# Patient Record
Sex: FEMALE | Race: WHITE | NOT HISPANIC OR LATINO | Employment: OTHER | URBAN - METROPOLITAN AREA
[De-identification: names, ages, dates, MRNs, and addresses within clinical notes are randomized per-mention and may not be internally consistent; named-entity substitution may affect disease eponyms.]

---

## 2017-01-06 ENCOUNTER — CLINICAL SUPPORT (OUTPATIENT)
Dept: ONCOLOGY | Facility: HOSPITAL | Age: 82
End: 2017-01-06

## 2017-01-06 ENCOUNTER — ANTICOAGULATION VISIT (OUTPATIENT)
Dept: LAB | Facility: HOSPITAL | Age: 82
End: 2017-01-06

## 2017-01-06 DIAGNOSIS — Z79.01 LONG TERM CURRENT USE OF ANTICOAGULANT: Primary | ICD-10-CM

## 2017-01-06 LAB
INR PPP: 2.1 (ref 0.9–1.1)
PROTHROMBIN TIME: 24.9 SECONDS (ref 11–13.5)

## 2017-01-06 PROCEDURE — 85610 PROTHROMBIN TIME: CPT | Performed by: INTERNAL MEDICINE

## 2017-01-27 ENCOUNTER — OFFICE VISIT (OUTPATIENT)
Dept: INTERNAL MEDICINE | Facility: CLINIC | Age: 82
End: 2017-01-27

## 2017-01-27 VITALS
BODY MASS INDEX: 33.66 KG/M2 | RESPIRATION RATE: 14 BRPM | HEART RATE: 53 BPM | SYSTOLIC BLOOD PRESSURE: 162 MMHG | WEIGHT: 190 LBS | DIASTOLIC BLOOD PRESSURE: 80 MMHG | HEIGHT: 63 IN | OXYGEN SATURATION: 98 %

## 2017-01-27 DIAGNOSIS — N18.30 CHRONIC KIDNEY DISEASE (CKD), STAGE III (MODERATE) (HCC): Primary | ICD-10-CM

## 2017-01-27 DIAGNOSIS — H69.82 EUSTACHIAN TUBE DYSFUNCTION, LEFT: Primary | ICD-10-CM

## 2017-01-27 DIAGNOSIS — R73.9 HYPERGLYCEMIA: ICD-10-CM

## 2017-01-27 DIAGNOSIS — R73.03 PREDIABETES: ICD-10-CM

## 2017-01-27 DIAGNOSIS — I10 BENIGN ESSENTIAL HTN: ICD-10-CM

## 2017-01-27 DIAGNOSIS — E78.5 HYPERLIPIDEMIA, UNSPECIFIED HYPERLIPIDEMIA TYPE: ICD-10-CM

## 2017-01-27 LAB
ALBUMIN SERPL-MCNC: 3.6 G/DL (ref 3.5–5.2)
ALBUMIN/GLOB SERPL: 1.5 G/DL
ALP SERPL-CCNC: 56 U/L (ref 39–117)
ALT SERPL-CCNC: 10 U/L (ref 1–33)
AST SERPL-CCNC: 14 U/L (ref 1–32)
BASOPHILS # BLD MANUAL: 0 10*3/MM3 (ref 0–0.2)
BASOPHILS NFR BLD MANUAL: 0 % (ref 0–1.5)
BILIRUB SERPL-MCNC: 0.6 MG/DL (ref 0.1–1.2)
BUN SERPL-MCNC: 20 MG/DL (ref 8–23)
BUN/CREAT SERPL: 23.3 (ref 7–25)
CALCIUM SERPL-MCNC: 9.3 MG/DL (ref 8.2–9.6)
CHLORIDE SERPL-SCNC: 103 MMOL/L (ref 98–107)
CHOLEST SERPL-MCNC: 179 MG/DL (ref 0–200)
CO2 SERPL-SCNC: 24.5 MMOL/L (ref 22–29)
CREAT SERPL-MCNC: 0.86 MG/DL (ref 0.57–1)
DIFFERENTIAL COMMENT: ABNORMAL
EOSINOPHIL # BLD MANUAL: 0 10*3/MM3 (ref 0–0.7)
EOSINOPHIL NFR BLD MANUAL: 0 % (ref 0.3–6.2)
ERYTHROCYTE [DISTWIDTH] IN BLOOD BY AUTOMATED COUNT: 14.6 % (ref 11.7–13)
GLOBULIN SER CALC-MCNC: 2.4 GM/DL
GLUCOSE SERPL-MCNC: 90 MG/DL (ref 65–99)
HBA1C MFR BLD: 5.59 % (ref 4.8–5.6)
HCT VFR BLD AUTO: 44.2 % (ref 35.6–45.5)
HDLC SERPL-MCNC: 48 MG/DL (ref 40–60)
HGB BLD-MCNC: 13.4 G/DL (ref 11.9–15.5)
LDLC SERPL CALC-MCNC: 115 MG/DL (ref 0–100)
LYMPHOCYTES # BLD MANUAL: 5.86 10*3/MM3 (ref 0.9–4.8)
LYMPHOCYTES NFR BLD MANUAL: 59 % (ref 19.6–45.3)
MCH RBC QN AUTO: 29.6 PG (ref 26.9–32)
MCHC RBC AUTO-ENTMCNC: 30.3 G/DL (ref 32.4–36.3)
MCV RBC AUTO: 97.8 FL (ref 80.5–98.2)
MONOCYTES # BLD MANUAL: 1.09 10*3/MM3 (ref 0.2–1.2)
MONOCYTES NFR BLD MANUAL: 11 % (ref 5–12)
NEUTROPHILS # BLD MANUAL: 2.98 10*3/MM3 (ref 1.9–8.1)
NEUTROPHILS NFR BLD MANUAL: 30 % (ref 42.7–76)
PLATELET # BLD AUTO: 93 10*3/MM3 (ref 140–500)
PLATELET BLD QL SMEAR: ABNORMAL
POTASSIUM SERPL-SCNC: 3.9 MMOL/L (ref 3.5–5.2)
PROT SERPL-MCNC: 6 G/DL (ref 6–8.5)
RBC # BLD AUTO: 4.52 10*6/MM3 (ref 3.9–5.2)
RBC MORPH BLD: ABNORMAL
SODIUM SERPL-SCNC: 141 MMOL/L (ref 136–145)
TRIGL SERPL-MCNC: 80 MG/DL (ref 0–150)
TSH SERPL DL<=0.005 MIU/L-ACNC: 2.46 MIU/ML (ref 0.27–4.2)
VLDLC SERPL CALC-MCNC: 16 MG/DL (ref 5–40)
WBC # BLD AUTO: 9.93 10*3/MM3 (ref 4.5–10.7)

## 2017-01-27 PROCEDURE — 99213 OFFICE O/P EST LOW 20 MIN: CPT | Performed by: INTERNAL MEDICINE

## 2017-01-27 RX ORDER — MELOXICAM 15 MG/1
TABLET ORAL
Qty: 30 TABLET | Refills: 3 | Status: SHIPPED | OUTPATIENT
Start: 2017-01-27 | End: 2017-10-05 | Stop reason: SDUPTHER

## 2017-01-27 NOTE — PROGRESS NOTES
Subjective     Virginia Benavidez is a 89 y.o. female who presents with   Chief Complaint   Patient presents with   • Earache     Left ear pain x6 days       History of Present Illness     Since Sunday.  Left ear pain.  No drainage.  No sore throat.  Runny nose.  No fever.  No palpable pain.  Eating and talking make worse.     Review of Systems   Respiratory: Negative.    Cardiovascular: Negative.        The following portions of the patient's history were reviewed and updated as appropriate: allergies, current medications and problem list.    Patient Active Problem List    Diagnosis Date Noted   • Gout of big toe 08/10/2016   • Long term current use of anticoagulant 05/25/2016   • Primary osteoarthritis involving multiple joints 02/04/2016   • Chronic kidney disease (CKD), stage III (moderate) 01/25/2016     Note Last Updated: 1/25/2016     Impression: 07/23/2015 - Her Creatinine is stable.  She is continuing to use diclofenac bid despite that in January we had stopped this and switched to Meloxicam.  Her daughter says they have the meloxicam at home.  It is really not ideal to be on any NSAIDS.  She really does not feel she can function without them.  For now we will closely monitor her creatinine.  I have very explicitly warned Virginia and her daughter that she is at risk of really hurting her kidneys with continued use of nsaids.;      • Benign essential HTN 01/25/2016     Note Last Updated: 1/25/2016     Impression: 07/23/2015 - Well controlled. Cont current meds.  Impression: 01/12/2015 - Well controlled.  Cont current meds.  Check CMP today  Impression: 07/10/2014 - Continue Current meds.  I have encouraged her to  drink more water to protect her kidneys since she is on Lisinopril/HCTZ;      • Gait instability 01/25/2016     Note Last Updated: 1/25/2016     Impression: 01/12/2015 - Balance issues.  She has done PT several times without improvement.  I have recommended that she go to [place] and look at the four  pronged cane and walkers available.;      • HLD (hyperlipidemia) 01/25/2016     Note Last Updated: 1/25/2016     Impression: 07/10/2014 - I am stopping her simvastatin to see if this helps with her aches and pains and instability.;      • Prediabetes 01/25/2016     Note Last Updated: 1/25/2016     Impression: 07/23/2015 - Her A1c has improved despite no changes in her diet.  Will continue to monitor q6 mo  Impression: 01/12/2015 - No polyuria or polydipsia.  Will repeat A1c in 6 months  Impression: 07/10/2014 - Repeat labs today;      • Degenerative joint disease involving multiple joints 01/25/2016     Note Last Updated: 1/25/2016     Impression: 07/23/2015 - She never switched to the meloxicam. She is still using the diclofenac.  her daughter is going to have her try the meloxicam instead.  We really have to keep a very close eye on her creatinine.  They understand the risk to her kidneys with continued use of nsaids.  Impression: 01/12/2015 - We are going to try once a day meloxicam instead of diclofenac.  I am checking her kidney and liver function today.  I am certainly worried about the continued exposure to NSAIDS at her age, but without it her arthritis is really limiting.  If this does not work, we will consider going back to 50 mg bid of diclofenac.  Impression: 07/10/2014 - Continue diclofenac  She has been on this for years.  I have discussed in detail with her and her daughter that this can effect her kidney function and if her creatinine starts to increase, we will need to look at other options for her pain.;      • Disorder of peripheral nervous system 01/25/2016     Note Last Updated: 1/25/2016     Impression: 01/12/2015 - She has been using voltaren gel and this seems to help her.  I have refilled this today.  Impression: 07/10/2014 - Check B12 and TSH today;          Current Outpatient Prescriptions on File Prior to Visit   Medication Sig Dispense Refill   • diclofenac (VOLTAREN) 1 % gel gel Place  "on the skin. Apply 4 grams to lower extremeties bid as needed     • dorzolamide (TRUSOPT) 2 % ophthalmic solution 1 drop 3 (three) times a day.     • lisinopril (PRINIVIL,ZESTRIL) 40 MG tablet TAKE ONE TABLET BY MOUTH DAILY 90 tablet 2   • meloxicam (MOBIC) 15 MG tablet TAKE ONE TABLET BY MOUTH DAILY WITH FOOD 30 tablet 3   • timolol (TIMOPTIC) 0.25 % ophthalmic solution 1 drop 2 (two) times a day.     • warfarin (COUMADIN) 2 MG tablet Take 4mg Saturday and Sunday and 3mg all other days or as directed by MD. 90 tablet 0     No current facility-administered medications on file prior to visit.        Objective     Visit Vitals   • /80 (BP Location: Right arm, Patient Position: Sitting, Cuff Size: Adult)   • Pulse 53   • Resp 14   • Ht 62.6\" (159 cm)   • Wt 190 lb (86.2 kg)   • SpO2 98%   • BMI 34.09 kg/m2       Physical Exam   Constitutional: She is oriented to person, place, and time. She appears well-developed and well-nourished.   HENT:   Head: Normocephalic and atraumatic.   Right Ear: Hearing and tympanic membrane normal.   Left Ear: Hearing and tympanic membrane normal.   Mouth/Throat: No oropharyngeal exudate or posterior oropharyngeal erythema.   Cardiovascular: Normal rate, regular rhythm and normal heart sounds.    Pulmonary/Chest: Effort normal and breath sounds normal.   Neurological: She is alert and oriented to person, place, and time.   Skin: Skin is warm and dry.   Psychiatric: She has a normal mood and affect. Her behavior is normal.       Assessment/Plan   Virginia was seen today for earache.    Diagnoses and all orders for this visit:    Eustachian tube dysfunction, left        Discussion    Patient presents with ear pain most c/w eustachian tube dysfunction.  TM is normal on exam.  Trial of Mucinex and Flonase OTC.  Let me know if not feeling better over the next 3 days or if there is any change in symptoms.  I would refer to ENT.      BP is increased today.  I did encourage monitoring at " home.  She requests her labs for Dr. Yip f/u that is coming up.           Future Appointments  Date Time Provider Department Center   2/7/2017 8:00 AM MD OSMANY [facility] P[facility] None   2/9/2017 8:00 AM LAB CHAIR 6 [facility]  LAB [facility]   2/9/2017 8:40 AM [facility]

## 2017-01-27 NOTE — MR AVS SNAPSHOT
Virginia PATINO Reema   1/27/2017 9:00 AM   Office Visit    Dept Phone:  [phone]   Encounter #:  [id number]    Provider:  Nelly Llanos MD   Department:  [facility] INTERNAL MEDICINE                Your Full Care Plan              Your Updated Medication List          This list is accurate as of: 1/27/17  9:16 AM.  Always use your most recent med list.                diclofenac 1 % gel gel   Commonly known as:  VOLTAREN       dorzolamide 2 % ophthalmic solution   Commonly known as:  TRUSOPT       lisinopril 40 MG tablet   Commonly known as:  PRINIVIL,ZESTRIL   TAKE ONE TABLET BY MOUTH DAILY       meloxicam 15 MG tablet   Commonly known as:  MOBIC   TAKE ONE TABLET BY MOUTH DAILY WITH FOOD       timolol 0.25 % ophthalmic solution   Commonly known as:  TIMOPTIC       warfarin 2 MG tablet   Commonly known as:  COUMADIN   Take 4mg Saturday and Sunday and 3mg all other days or as directed by MD.               Instructions     None    Patient Instructions History      Upcoming Appointments     Visit Type Date Time Department    OFFICE VISIT 1/27/2017  9:00 AM [facility]    OFFICE VISIT 2/7/2017  8:00 AM [facility]    FOLLOW UP 2 UNIT 2/9/2017  8:40 AM [facility] ONC CBC [facility]    LAB 2/9/2017  8:00 AM [facility] ONC CBC LAB K[facility]      MyChart Signup     Our records indicate that your [facility] account has been deactivated. If you would like to reactivate your account, please email [email or web address] or call [phone] to talk to our [facility] staff.             Other Info from Your Visit           Your Appointments     Feb 07, 2017  8:00 AM EST   Office Visit with Lena Yip MD   [facility] INTERNAL MEDICINE (--)    [address]  [facility] [address] [phone]           Arrive 15 minutes prior to appointment.            Feb 09, 2017  8:00 AM EST   Lab with LAB CHAIR 6 NOEMY SY   [facility] CBC  "LAB ([place])    [address] [address]   [phone]            Feb 09, 2017  8:40 AM EST   FOLLOW UP with J Luis Velez MD   [facility]: [facility]SULT[facility] IN BLOOD DISORDERS AND CANCER ([facility])    [address] [address]   [phone]              Allergies     Morphine And Related      Penicillins        Reason for Visit     Earache Left ear pain x6 days      Vital Signs     Blood Pressure Pulse Respirations Height Weight Oxygen Saturation    162/80 (BP Location: Right arm, Patient Position: Sitting, Cuff Size: Adult) 53 14 62.6\" (159 cm) 190 lb (86.2 kg) 98%    Body Mass Index Smoking Status                34.09 kg/m2 Never Smoker            "

## 2017-02-01 ENCOUNTER — APPOINTMENT (OUTPATIENT)
Dept: LAB | Facility: HOSPITAL | Age: 82
End: 2017-02-01

## 2017-02-01 ENCOUNTER — APPOINTMENT (OUTPATIENT)
Dept: ONCOLOGY | Facility: HOSPITAL | Age: 82
End: 2017-02-01

## 2017-02-03 RX ORDER — WARFARIN SODIUM 2 MG/1
TABLET ORAL
Qty: 90 TABLET | Refills: 0 | Status: SHIPPED | OUTPATIENT
Start: 2017-02-03 | End: 2017-03-31 | Stop reason: SDUPTHER

## 2017-02-06 ENCOUNTER — RESULTS ENCOUNTER (OUTPATIENT)
Dept: INTERNAL MEDICINE | Facility: CLINIC | Age: 82
End: 2017-02-06

## 2017-02-06 DIAGNOSIS — N18.30 CHRONIC KIDNEY DISEASE (CKD), STAGE III (MODERATE) (HCC): ICD-10-CM

## 2017-02-06 DIAGNOSIS — M10.9 GOUT OF BIG TOE: ICD-10-CM

## 2017-02-06 DIAGNOSIS — I10 BENIGN ESSENTIAL HTN: ICD-10-CM

## 2017-02-06 DIAGNOSIS — R73.03 PREDIABETES: ICD-10-CM

## 2017-02-07 ENCOUNTER — OFFICE VISIT (OUTPATIENT)
Dept: INTERNAL MEDICINE | Facility: CLINIC | Age: 82
End: 2017-02-07

## 2017-02-07 VITALS
OXYGEN SATURATION: 98 % | WEIGHT: 182 LBS | HEIGHT: 63 IN | BODY MASS INDEX: 32.25 KG/M2 | DIASTOLIC BLOOD PRESSURE: 60 MMHG | HEART RATE: 59 BPM | SYSTOLIC BLOOD PRESSURE: 144 MMHG | RESPIRATION RATE: 18 BRPM

## 2017-02-07 DIAGNOSIS — R73.03 PREDIABETES: ICD-10-CM

## 2017-02-07 DIAGNOSIS — N18.30 CHRONIC KIDNEY DISEASE (CKD), STAGE III (MODERATE) (HCC): ICD-10-CM

## 2017-02-07 DIAGNOSIS — I10 BENIGN ESSENTIAL HTN: Primary | ICD-10-CM

## 2017-02-07 DIAGNOSIS — R60.0 BILATERAL LOWER EXTREMITY EDEMA: ICD-10-CM

## 2017-02-07 PROCEDURE — 99214 OFFICE O/P EST MOD 30 MIN: CPT | Performed by: INTERNAL MEDICINE

## 2017-02-07 RX ORDER — FUROSEMIDE 20 MG/1
10 TABLET ORAL DAILY PRN
Qty: 15 TABLET | Refills: 3 | Status: SHIPPED | OUTPATIENT
Start: 2017-02-07 | End: 2017-10-11

## 2017-02-09 ENCOUNTER — LAB (OUTPATIENT)
Dept: LAB | Facility: HOSPITAL | Age: 82
End: 2017-02-09

## 2017-02-09 ENCOUNTER — OFFICE VISIT (OUTPATIENT)
Dept: ONCOLOGY | Facility: CLINIC | Age: 82
End: 2017-02-09

## 2017-02-09 VITALS
SYSTOLIC BLOOD PRESSURE: 122 MMHG | HEART RATE: 68 BPM | BODY MASS INDEX: 32.92 KG/M2 | RESPIRATION RATE: 16 BRPM | WEIGHT: 185.8 LBS | DIASTOLIC BLOOD PRESSURE: 72 MMHG | HEIGHT: 63 IN | TEMPERATURE: 97.6 F

## 2017-02-09 DIAGNOSIS — D68.59 THROMBOPHILIA (HCC): Primary | ICD-10-CM

## 2017-02-09 DIAGNOSIS — Z79.01 LONG TERM CURRENT USE OF ANTICOAGULANT: Primary | ICD-10-CM

## 2017-02-09 DIAGNOSIS — C91.10 CLL (CHRONIC LYMPHOCYTIC LEUKEMIA) (HCC): ICD-10-CM

## 2017-02-09 LAB
DEPRECATED RDW RBC AUTO: 47.6 FL (ref 37–49)
ERYTHROCYTE [DISTWIDTH] IN BLOOD BY AUTOMATED COUNT: 14.1 % (ref 11.7–14.5)
HCT VFR BLD AUTO: 39.2 % (ref 34–45)
HGB BLD-MCNC: 12.7 G/DL (ref 11.5–14.9)
INR PPP: 1.7 (ref 0.9–1.1)
MCH RBC QN AUTO: 30 PG (ref 27–33)
MCHC RBC AUTO-ENTMCNC: 32.4 G/DL (ref 32–35)
MCV RBC AUTO: 92.7 FL (ref 83–97)
PLATELET # BLD AUTO: 72 10*3/MM3 (ref 150–375)
PMV BLD AUTO: 12.6 FL (ref 8.9–12.1)
PROTHROMBIN TIME: 20.7 SECONDS (ref 11–13.5)
RBC # BLD AUTO: 4.23 10*6/MM3 (ref 3.9–5)
WBC NRBC COR # BLD: 9.42 10*3/MM3 (ref 4–10)

## 2017-02-09 PROCEDURE — 85610 PROTHROMBIN TIME: CPT | Performed by: INTERNAL MEDICINE

## 2017-02-09 PROCEDURE — 99214 OFFICE O/P EST MOD 30 MIN: CPT | Performed by: INTERNAL MEDICINE

## 2017-02-09 PROCEDURE — 36416 COLLJ CAPILLARY BLOOD SPEC: CPT | Performed by: INTERNAL MEDICINE

## 2017-02-09 PROCEDURE — 85027 COMPLETE CBC AUTOMATED: CPT | Performed by: INTERNAL MEDICINE

## 2017-02-09 NOTE — PROGRESS NOTES
Subjective .     REASONS FOR FOLLOWUP:    CLL  Thrombophilia  Thrombocytopenia    HISTORY OF PRESENT ILLNESS:  The patient is a 89 y.o. year old female  who is here for follow-up with the above-mentioned history.   She returns today for ongoing warfarin management. She also has exacerbation of thrombocytopenia.Despite these issues, she feels well.    Past Medical History   Diagnosis Date   • Antiphospholipid syndrome    • Atresia (acquired) of cervix      atresia   • Breast cancer      2005 with lumpectomy   • Chronic kidney disease      Stage III   • Chronic lymphocytic leukemia    • Cystocele    • Deep vein thrombosis of lower extremity    • Glaucoma    • History of radiation therapy    • Hyperlipidemia    • Hypertension    • Osteoarthritis    • Thrombocytopenia      Past Surgical History   Procedure Laterality Date   • Cholecystectomy     • Back surgery     • Breast biopsy  1992   • Breast lumpectomy         HEMATOLOGIC/ONCOLOGIC HISTORY:  (History from previous dates can be found in the separate document.)    MEDICATIONS    Current Outpatient Prescriptions:   •  diclofenac (VOLTAREN) 1 % gel gel, Place on the skin. Apply 4 grams to lower extremeties bid as needed, Disp: , Rfl:   •  dorzolamide (TRUSOPT) 2 % ophthalmic solution, 1 drop 3 (three) times a day., Disp: , Rfl:   •  furosemide (LASIX) 20 MG tablet, Take 0.5 tablets by mouth Daily As Needed (edema)., Disp: 15 tablet, Rfl: 3  •  lisinopril (PRINIVIL,ZESTRIL) 40 MG tablet, TAKE ONE TABLET BY MOUTH DAILY, Disp: 90 tablet, Rfl: 2  •  meloxicam (MOBIC) 15 MG tablet, TAKE ONE TABLET BY MOUTH DAILY WITH FOOD, Disp: 30 tablet, Rfl: 3  •  timolol (TIMOPTIC) 0.25 % ophthalmic solution, 1 drop 2 (two) times a day., Disp: , Rfl:   •  warfarin (COUMADIN) 2 MG tablet, TAKE ONE AND ONE-HALF (1  1/2) TABLET BY MOUTH DAILY FIVE DAYS A WEEK AND TAKE TWO TABLETS BY MOUTH DAILY SATURDAY AND SUNDAY OR AS DIRECTED, Disp: 90 tablet, Rfl: 0    ALLERGIES:     Allergies  "  Allergen Reactions   • Morphine And Related    • Penicillins        SOCIAL HISTORY:       Social History     Social History   • Marital status: Single     Spouse name: N/A   • Number of children: 3   • Years of education: N/A     Occupational History   •  Retired     Social History Main Topics   • Smoking status: Never Smoker   • Smokeless tobacco: Never Used   • Alcohol use Yes      Comment: Occasionally   • Drug use: Not on file   • Sexual activity: Not on file     Other Topics Concern   • Not on file     Social History Narrative         FAMILY HISTORY:  Family History   Problem Relation Age of Onset   • Alzheimer's disease Mother    • Emphysema Father    • Lung cancer Sister        REVIEW OF SYSTEMS:  GENERAL: No change in appetite or weight;   No fevers, chills, sweats.    SKIN: No nonhealing lesions.   No rashes.  HEME/LYMPH: No easy bruising, bleeding.   No swollen nodes.   EYES: No vision changes or diplopia.   ENT: No tinnitus, hearing loss, gum bleeding, epistaxis, hoarseness or dysphagia.   RESPIRATORY: No cough, shortness of breath, hemoptysis or wheezing.   CVS: No chest pain, palpitations, orthopnea, dyspnea on exertion or PND.   GI: No melena or hematochezia.   No abdominal pain.  No nausea, vomiting, constipation, diarrhea  : No lower tract obstructive symptoms, dysuria or hematuria.   MUSCULOSKELETAL: No bone pain.  No joint stiffness.   NEUROLOGICAL: No global weakness, loss of consciousness or seizures.   PSYCHIATRIC: No increased nervousness, mood changes or depression.     Objective    Vitals:    02/09/17 0807   BP: 122/72   Pulse: 68   Resp: 16   Temp: 97.6 °F (36.4 °C)   Weight: 185 lb 12.8 oz (84.3 kg)   Height: 62.6\" (159 cm)   PainSc: 0-No pain     Current Status 2/9/2017   ECOG score 0      PHYSICAL EXAM:    GENERAL:  Well-developed, well-nourished in no acute distress.   SKIN:  Warm, dry without rashes, purpura or petechiae.  EYES:  Pupils equal, round and reactive to light.  EOMs " intact.  Conjunctivae normal.  EARS:  Hearing intact.  NOSE:  Septum midline.  No excoriations or nasal discharge.  MOUTH:  Tongue is well-papillated; no stomatitis or ulcers.  Lips normal.  THROAT:  Oropharynx without lesions or exudates.  NECK:  Supple with good range of motion; no thyromegaly or masses, no JVD.  LYMPHATICS:  No cervical, supraclavicular, axillary or inguinal adenopathy.  CHEST:  Lungs clear to auscultation. Good airflow.  CARDIAC:  Regular rate and rhythm without murmurs, rubs or gallops. Normal S1,S2.  ABDOMEN:  Soft, nontender with no hepatosplenomegaly or masses.  EXTREMITIES:  No clubbing, cyanosis or edema.  NEUROLOGICAL:  Cranial Nerves II-XII grossly intact.  No focal neurological deficits.  PSYCHIATRIC:  Normal affect and mood.     RECENT LABS:        WBC   Date/Time Value Ref Range Status   02/09/2017 07:54 AM 9.42 4.00 - 10.00 10*3/mm3 Final   01/27/2017 09:11 AM 9.93 4.50 - 10.70 10*3/mm3 Final     HEMOGLOBIN   Date/Time Value Ref Range Status   02/09/2017 07:54 AM 12.7 11.5 - 14.9 g/dL Final   01/27/2017 09:11 AM 13.4 11.9 - 15.5 g/dL Final     PLATELETS   Date/Time Value Ref Range Status   02/09/2017 07:54 AM 72 (L) 150 - 375 10*3/mm3 Final   01/27/2017 09:11 AM 93 (L) 140 - 500 10*3/mm3 Final       Assessment/Plan   Thrombophilia: we will recheck her thrombophilia labs to assess ongoing need for warfarin as her platelets are borderline.   We will, in an effort to minimize risk of bleed,lower our INR range to 2-2.5.  CLL: no progression evident except perhaps the thrombocytopenia.  Thrombocytopenia:keep target INR 2-2.5 to minimize risk of bleed. If thrombophilia results are more favorable, then d/c warfarin?.

## 2017-03-07 ENCOUNTER — CLINICAL SUPPORT (OUTPATIENT)
Dept: ONCOLOGY | Facility: HOSPITAL | Age: 82
End: 2017-03-07

## 2017-03-07 ENCOUNTER — ANTICOAGULATION VISIT (OUTPATIENT)
Dept: LAB | Facility: HOSPITAL | Age: 82
End: 2017-03-07

## 2017-03-07 DIAGNOSIS — D68.59 THROMBOPHILIA (HCC): ICD-10-CM

## 2017-03-07 DIAGNOSIS — C91.10 CLL (CHRONIC LYMPHOCYTIC LEUKEMIA) (HCC): ICD-10-CM

## 2017-03-07 LAB
ALBUMIN SERPL-MCNC: 3.7 G/DL (ref 3.5–5.2)
ALBUMIN/GLOB SERPL: 1.5 G/DL (ref 1.1–2.4)
ALP SERPL-CCNC: 51 U/L (ref 38–116)
ALT SERPL W P-5'-P-CCNC: 11 U/L (ref 0–33)
ANION GAP SERPL CALCULATED.3IONS-SCNC: 14.1 MMOL/L
AST SERPL-CCNC: 17 U/L (ref 0–32)
BASOPHILS # BLD AUTO: 0.02 10*3/MM3 (ref 0–0.1)
BASOPHILS NFR BLD AUTO: 0.2 % (ref 0–1.1)
BILIRUB SERPL-MCNC: 0.6 MG/DL (ref 0.1–1.2)
BUN BLD-MCNC: 25 MG/DL (ref 6–20)
BUN/CREAT SERPL: 24.8 (ref 7.3–30)
CALCIUM SPEC-SCNC: 9.3 MG/DL (ref 8.5–10.2)
CHLORIDE SERPL-SCNC: 105 MMOL/L (ref 98–107)
CO2 SERPL-SCNC: 22.9 MMOL/L (ref 22–29)
CREAT BLD-MCNC: 1.01 MG/DL (ref 0.6–1.1)
DEPRECATED RDW RBC AUTO: 47.7 FL (ref 37–49)
EOSINOPHIL # BLD AUTO: 0 10*3/MM3 (ref 0–0.36)
EOSINOPHIL NFR BLD AUTO: 0 % (ref 1–5)
ERYTHROCYTE [DISTWIDTH] IN BLOOD BY AUTOMATED COUNT: 13.9 % (ref 11.7–14.5)
GFR SERPL CREATININE-BSD FRML MDRD: 51 ML/MIN/1.73
GLOBULIN UR ELPH-MCNC: 2.5 GM/DL (ref 1.8–3.5)
GLUCOSE BLD-MCNC: 154 MG/DL (ref 74–124)
HCT VFR BLD AUTO: 40.5 % (ref 34–45)
HGB BLD-MCNC: 12.8 G/DL (ref 11.5–14.9)
IMM GRANULOCYTES # BLD: 0.01 10*3/MM3 (ref 0–0.03)
IMM GRANULOCYTES NFR BLD: 0.1 % (ref 0–0.5)
INR PPP: 2.2 (ref 0.9–1.1)
LYMPHOCYTES # BLD AUTO: 7.35 10*3/MM3 (ref 1–3.5)
LYMPHOCYTES NFR BLD AUTO: 77.4 % (ref 20–49)
MCH RBC QN AUTO: 29.7 PG (ref 27–33)
MCHC RBC AUTO-ENTMCNC: 31.6 G/DL (ref 32–35)
MCV RBC AUTO: 94 FL (ref 83–97)
MONOCYTES # BLD AUTO: 0.38 10*3/MM3 (ref 0.25–0.8)
MONOCYTES NFR BLD AUTO: 4 % (ref 4–12)
NEUTROPHILS # BLD AUTO: 1.74 10*3/MM3 (ref 1.5–7)
NEUTROPHILS NFR BLD AUTO: 18.3 % (ref 39–75)
NRBC BLD MANUAL-RTO: 0 /100 WBC (ref 0–0)
PLATELET # BLD AUTO: 89 10*3/MM3 (ref 150–375)
PMV BLD AUTO: 11.6 FL (ref 8.9–12.1)
POTASSIUM BLD-SCNC: 4 MMOL/L (ref 3.5–4.7)
PROT SERPL-MCNC: 6.2 G/DL (ref 6.3–8)
PROTHROMBIN TIME: 26.9 SECONDS (ref 11–13.5)
RBC # BLD AUTO: 4.31 10*6/MM3 (ref 3.9–5)
SODIUM BLD-SCNC: 142 MMOL/L (ref 134–145)
WBC NRBC COR # BLD: 9.5 10*3/MM3 (ref 4–10)

## 2017-03-07 PROCEDURE — 85025 COMPLETE CBC W/AUTO DIFF WBC: CPT | Performed by: INTERNAL MEDICINE

## 2017-03-07 PROCEDURE — 36415 COLL VENOUS BLD VENIPUNCTURE: CPT | Performed by: INTERNAL MEDICINE

## 2017-03-07 PROCEDURE — 80053 COMPREHEN METABOLIC PANEL: CPT | Performed by: INTERNAL MEDICINE

## 2017-03-07 PROCEDURE — 85610 PROTHROMBIN TIME: CPT | Performed by: INTERNAL MEDICINE

## 2017-03-08 LAB
CARDIOLIPIN IGA SER IA-ACNC: <9 APL U/ML (ref 0–11)
CARDIOLIPIN IGA SER IA-ACNC: <9 APL U/ML (ref 0–11)
CARDIOLIPIN IGG SER IA-ACNC: <9 GPL U/ML (ref 0–14)
CARDIOLIPIN IGG SER IA-ACNC: <9 GPL U/ML (ref 0–14)
CARDIOLIPIN IGM SER IA-ACNC: 14 MPL U/ML (ref 0–12)
CARDIOLIPIN IGM SER IA-ACNC: 14 MPL U/ML (ref 0–12)

## 2017-03-09 LAB
B2 GLYCOPROT1 IGA SER-ACNC: <9 GPI IGA UNITS (ref 0–25)
B2 GLYCOPROT1 IGG SER-ACNC: <9 GPI IGG UNITS (ref 0–20)
B2 GLYCOPROT1 IGM SER-ACNC: 46 GPI IGM UNITS (ref 0–32)
PS IGA SER-ACNC: 1 APS IGA (ref 0–20)
PS IGG SER-ACNC: 1 GPS IGG (ref 0–11)
PS IGM SER-ACNC: 17 MPS IGM (ref 0–25)

## 2017-03-10 LAB
APTT HEX PL PPP: 11 SEC (ref 0–11)
INCUBATED PTT LA MIX: 42 SEC (ref 0–40.6)
LA NT DPL PPP: 86.7 SEC (ref 0–55)
LA NT DPL/LA NT HPL PPP-RTO: 0.99 RATIO (ref 0–1.4)
LA NT PLATELET PPP: 45 SEC (ref 0–43.6)
LUPUS ANTICOAGULANT REFLEX: ABNORMAL
PTT LA MIX: 38.1 SEC (ref 0–40.6)
SCREEN DRVVT: 40.7 SEC (ref 0–44)
THROMBIN TIME: 16.1 SEC (ref 0–20.9)

## 2017-03-15 ENCOUNTER — APPOINTMENT (OUTPATIENT)
Dept: LAB | Facility: HOSPITAL | Age: 82
End: 2017-03-15

## 2017-03-15 ENCOUNTER — APPOINTMENT (OUTPATIENT)
Dept: ONCOLOGY | Facility: HOSPITAL | Age: 82
End: 2017-03-15

## 2017-04-03 RX ORDER — WARFARIN SODIUM 2 MG/1
TABLET ORAL
Qty: 90 TABLET | Refills: 0 | Status: SHIPPED | OUTPATIENT
Start: 2017-04-03 | End: 2017-06-05 | Stop reason: SDUPTHER

## 2017-04-05 ENCOUNTER — CLINICAL SUPPORT (OUTPATIENT)
Dept: ONCOLOGY | Facility: HOSPITAL | Age: 82
End: 2017-04-05

## 2017-04-05 ENCOUNTER — ANTICOAGULATION VISIT (OUTPATIENT)
Dept: LAB | Facility: HOSPITAL | Age: 82
End: 2017-04-05

## 2017-04-05 DIAGNOSIS — Z79.01 LONG TERM CURRENT USE OF ANTICOAGULANT: ICD-10-CM

## 2017-04-05 DIAGNOSIS — D68.59 THROMBOPHILIA (HCC): Primary | ICD-10-CM

## 2017-04-05 LAB
BASOPHILS # BLD AUTO: 0.02 10*3/MM3 (ref 0–0.1)
BASOPHILS NFR BLD AUTO: 0.2 % (ref 0–1.1)
DEPRECATED RDW RBC AUTO: 46.6 FL (ref 37–49)
EOSINOPHIL # BLD AUTO: 0.06 10*3/MM3 (ref 0–0.36)
EOSINOPHIL NFR BLD AUTO: 0.6 % (ref 1–5)
ERYTHROCYTE [DISTWIDTH] IN BLOOD BY AUTOMATED COUNT: 13.8 % (ref 11.7–14.5)
HCT VFR BLD AUTO: 40.2 % (ref 34–45)
HGB BLD-MCNC: 13.1 G/DL (ref 11.5–14.9)
IMM GRANULOCYTES # BLD: 0.03 10*3/MM3 (ref 0–0.03)
IMM GRANULOCYTES NFR BLD: 0.3 % (ref 0–0.5)
INR PPP: 2.9 (ref 0.9–1.1)
LYMPHOCYTES # BLD AUTO: 6.6 10*3/MM3 (ref 1–3.5)
LYMPHOCYTES NFR BLD AUTO: 70.1 % (ref 20–49)
MCH RBC QN AUTO: 29.9 PG (ref 27–33)
MCHC RBC AUTO-ENTMCNC: 32.6 G/DL (ref 32–35)
MCV RBC AUTO: 91.8 FL (ref 83–97)
MONOCYTES # BLD AUTO: 0.73 10*3/MM3 (ref 0.25–0.8)
MONOCYTES NFR BLD AUTO: 7.7 % (ref 4–12)
NEUTROPHILS # BLD AUTO: 1.98 10*3/MM3 (ref 1.5–7)
NEUTROPHILS NFR BLD AUTO: 21.1 % (ref 39–75)
NRBC BLD MANUAL-RTO: 0 /100 WBC (ref 0–0)
PLATELET # BLD AUTO: 82 10*3/MM3 (ref 150–375)
PMV BLD AUTO: 11.5 FL (ref 8.9–12.1)
PROTHROMBIN TIME: 34.5 SECONDS (ref 11–13.5)
RBC # BLD AUTO: 4.38 10*6/MM3 (ref 3.9–5)
WBC NRBC COR # BLD: 9.42 10*3/MM3 (ref 4–10)

## 2017-04-05 PROCEDURE — 36415 COLL VENOUS BLD VENIPUNCTURE: CPT | Performed by: INTERNAL MEDICINE

## 2017-04-05 PROCEDURE — 85025 COMPLETE CBC W/AUTO DIFF WBC: CPT | Performed by: INTERNAL MEDICINE

## 2017-04-05 PROCEDURE — 85610 PROTHROMBIN TIME: CPT | Performed by: INTERNAL MEDICINE

## 2017-05-03 ENCOUNTER — CLINICAL SUPPORT (OUTPATIENT)
Dept: ONCOLOGY | Facility: HOSPITAL | Age: 82
End: 2017-05-03

## 2017-05-03 ENCOUNTER — ANTICOAGULATION VISIT (OUTPATIENT)
Dept: LAB | Facility: HOSPITAL | Age: 82
End: 2017-05-03

## 2017-05-03 DIAGNOSIS — Z79.01 LONG TERM CURRENT USE OF ANTICOAGULANT: Primary | ICD-10-CM

## 2017-05-03 LAB
DEPRECATED RDW RBC AUTO: 47.8 FL (ref 37–49)
ERYTHROCYTE [DISTWIDTH] IN BLOOD BY AUTOMATED COUNT: 14 % (ref 11.7–14.5)
HCT VFR BLD AUTO: 40.2 % (ref 34–45)
HGB BLD-MCNC: 13.1 G/DL (ref 11.5–14.9)
INR PPP: 2.6 (ref 0.9–1.1)
MCH RBC QN AUTO: 30.4 PG (ref 27–33)
MCHC RBC AUTO-ENTMCNC: 32.6 G/DL (ref 32–35)
MCV RBC AUTO: 93.3 FL (ref 83–97)
PLATELET # BLD AUTO: 73 10*3/MM3 (ref 150–375)
PMV BLD AUTO: 12.1 FL (ref 8.9–12.1)
PROTHROMBIN TIME: 31.7 SECONDS (ref 11–13.5)
RBC # BLD AUTO: 4.31 10*6/MM3 (ref 3.9–5)
WBC NRBC COR # BLD: 9.86 10*3/MM3 (ref 4–10)

## 2017-05-03 PROCEDURE — 85610 PROTHROMBIN TIME: CPT

## 2017-05-03 PROCEDURE — 36415 COLL VENOUS BLD VENIPUNCTURE: CPT

## 2017-05-03 PROCEDURE — 85027 COMPLETE CBC AUTOMATED: CPT

## 2017-05-24 ENCOUNTER — OFFICE VISIT (OUTPATIENT)
Dept: ONCOLOGY | Facility: CLINIC | Age: 82
End: 2017-05-24

## 2017-05-24 ENCOUNTER — LAB (OUTPATIENT)
Dept: LAB | Facility: HOSPITAL | Age: 82
End: 2017-05-24

## 2017-05-24 VITALS
WEIGHT: 182.6 LBS | OXYGEN SATURATION: 97 % | SYSTOLIC BLOOD PRESSURE: 154 MMHG | RESPIRATION RATE: 16 BRPM | DIASTOLIC BLOOD PRESSURE: 62 MMHG | TEMPERATURE: 97.8 F | BODY MASS INDEX: 32.36 KG/M2 | HEIGHT: 63 IN | HEART RATE: 54 BPM

## 2017-05-24 DIAGNOSIS — D68.59 THROMBOPHILIA (HCC): Primary | ICD-10-CM

## 2017-05-24 DIAGNOSIS — C91.10 CLL (CHRONIC LYMPHOCYTIC LEUKEMIA) (HCC): ICD-10-CM

## 2017-05-24 LAB
DEPRECATED RDW RBC AUTO: 47.3 FL (ref 37–49)
ERYTHROCYTE [DISTWIDTH] IN BLOOD BY AUTOMATED COUNT: 14 % (ref 11.7–14.5)
HCT VFR BLD AUTO: 41.5 % (ref 34–45)
HGB BLD-MCNC: 13.7 G/DL (ref 11.5–14.9)
INR PPP: 1.9 (ref 0.9–1.1)
MCH RBC QN AUTO: 30.6 PG (ref 27–33)
MCHC RBC AUTO-ENTMCNC: 33 G/DL (ref 32–35)
MCV RBC AUTO: 92.8 FL (ref 83–97)
PLATELET # BLD AUTO: 82 10*3/MM3 (ref 150–375)
PMV BLD AUTO: 11.9 FL (ref 8.9–12.1)
PROTHROMBIN TIME: 23.3 SECONDS (ref 11–13.5)
RBC # BLD AUTO: 4.47 10*6/MM3 (ref 3.9–5)
WBC NRBC COR # BLD: 9.23 10*3/MM3 (ref 4–10)

## 2017-05-24 PROCEDURE — 85027 COMPLETE CBC AUTOMATED: CPT

## 2017-05-24 PROCEDURE — 36415 COLL VENOUS BLD VENIPUNCTURE: CPT

## 2017-05-24 PROCEDURE — 99214 OFFICE O/P EST MOD 30 MIN: CPT | Performed by: INTERNAL MEDICINE

## 2017-05-24 PROCEDURE — 85610 PROTHROMBIN TIME: CPT

## 2017-06-05 RX ORDER — WARFARIN SODIUM 2 MG/1
TABLET ORAL
Qty: 90 TABLET | Refills: 0 | Status: SHIPPED | OUTPATIENT
Start: 2017-06-05 | End: 2017-08-01 | Stop reason: SDUPTHER

## 2017-06-21 ENCOUNTER — CLINICAL SUPPORT (OUTPATIENT)
Dept: ONCOLOGY | Facility: HOSPITAL | Age: 82
End: 2017-06-21

## 2017-06-21 ENCOUNTER — ANTICOAGULATION VISIT (OUTPATIENT)
Dept: LAB | Facility: HOSPITAL | Age: 82
End: 2017-06-21

## 2017-06-21 DIAGNOSIS — D68.59 THROMBOPHILIA (HCC): ICD-10-CM

## 2017-06-21 DIAGNOSIS — N18.30 CHRONIC KIDNEY DISEASE (CKD), STAGE III (MODERATE) (HCC): Primary | ICD-10-CM

## 2017-06-21 DIAGNOSIS — Z79.01 LONG TERM CURRENT USE OF ANTICOAGULANT: ICD-10-CM

## 2017-06-21 LAB
BASOPHILS # BLD AUTO: 0.02 10*3/MM3 (ref 0–0.1)
BASOPHILS NFR BLD AUTO: 0.2 % (ref 0–1.1)
DEPRECATED RDW RBC AUTO: 48 FL (ref 37–49)
EOSINOPHIL # BLD AUTO: 0.06 10*3/MM3 (ref 0–0.36)
EOSINOPHIL NFR BLD AUTO: 0.6 % (ref 1–5)
ERYTHROCYTE [DISTWIDTH] IN BLOOD BY AUTOMATED COUNT: 13.9 % (ref 11.7–14.5)
HCT VFR BLD AUTO: 42.1 % (ref 34–45)
HGB BLD-MCNC: 13.7 G/DL (ref 11.5–14.9)
IMM GRANULOCYTES # BLD: 0.02 10*3/MM3 (ref 0–0.03)
IMM GRANULOCYTES NFR BLD: 0.2 % (ref 0–0.5)
INR PPP: 2 (ref 0.9–1.1)
LYMPHOCYTES # BLD AUTO: 7.29 10*3/MM3 (ref 1–3.5)
LYMPHOCYTES NFR BLD AUTO: 72.8 % (ref 20–49)
MCH RBC QN AUTO: 30.6 PG (ref 27–33)
MCHC RBC AUTO-ENTMCNC: 32.5 G/DL (ref 32–35)
MCV RBC AUTO: 94.2 FL (ref 83–97)
MONOCYTES # BLD AUTO: 0.7 10*3/MM3 (ref 0.25–0.8)
MONOCYTES NFR BLD AUTO: 7 % (ref 4–12)
NEUTROPHILS # BLD AUTO: 1.93 10*3/MM3 (ref 1.5–7)
NEUTROPHILS NFR BLD AUTO: 19.2 % (ref 39–75)
NRBC BLD MANUAL-RTO: 0 /100 WBC (ref 0–0)
PLATELET # BLD AUTO: 95 10*3/MM3 (ref 150–375)
PMV BLD AUTO: 10.8 FL (ref 8.9–12.1)
PROTHROMBIN TIME: 23.5 SECONDS (ref 11–13.5)
RBC # BLD AUTO: 4.47 10*6/MM3 (ref 3.9–5)
WBC NRBC COR # BLD: 10.02 10*3/MM3 (ref 4–10)

## 2017-06-21 PROCEDURE — 85610 PROTHROMBIN TIME: CPT | Performed by: INTERNAL MEDICINE

## 2017-06-21 PROCEDURE — 85025 COMPLETE CBC W/AUTO DIFF WBC: CPT | Performed by: INTERNAL MEDICINE

## 2017-06-21 PROCEDURE — 36415 COLL VENOUS BLD VENIPUNCTURE: CPT | Performed by: INTERNAL MEDICINE

## 2017-08-01 RX ORDER — WARFARIN SODIUM 2 MG/1
TABLET ORAL
Qty: 90 TABLET | Refills: 0 | Status: SHIPPED | OUTPATIENT
Start: 2017-08-01 | End: 2017-09-27 | Stop reason: SDUPTHER

## 2017-08-02 ENCOUNTER — ANTICOAGULATION VISIT (OUTPATIENT)
Dept: LAB | Facility: HOSPITAL | Age: 82
End: 2017-08-02

## 2017-08-02 ENCOUNTER — CLINICAL SUPPORT (OUTPATIENT)
Dept: ONCOLOGY | Facility: HOSPITAL | Age: 82
End: 2017-08-02

## 2017-08-02 DIAGNOSIS — Z79.01 LONG TERM CURRENT USE OF ANTICOAGULANT: Primary | ICD-10-CM

## 2017-08-02 LAB
BASOPHILS # BLD AUTO: 0.03 10*3/MM3 (ref 0–0.1)
BASOPHILS NFR BLD AUTO: 0.3 % (ref 0–1.1)
DEPRECATED RDW RBC AUTO: 47.3 FL (ref 37–49)
EOSINOPHIL # BLD AUTO: 0.06 10*3/MM3 (ref 0–0.36)
EOSINOPHIL NFR BLD AUTO: 0.5 % (ref 1–5)
ERYTHROCYTE [DISTWIDTH] IN BLOOD BY AUTOMATED COUNT: 13.9 % (ref 11.7–14.5)
HCT VFR BLD AUTO: 41.6 % (ref 34–45)
HGB BLD-MCNC: 13.6 G/DL (ref 11.5–14.9)
IMM GRANULOCYTES # BLD: 0.01 10*3/MM3 (ref 0–0.03)
IMM GRANULOCYTES NFR BLD: 0.1 % (ref 0–0.5)
INR PPP: 2 (ref 0.9–1.1)
LYMPHOCYTES # BLD AUTO: 8.65 10*3/MM3 (ref 1–3.5)
LYMPHOCYTES NFR BLD AUTO: 76.4 % (ref 20–49)
MCH RBC QN AUTO: 30.6 PG (ref 27–33)
MCHC RBC AUTO-ENTMCNC: 32.7 G/DL (ref 32–35)
MCV RBC AUTO: 93.7 FL (ref 83–97)
MONOCYTES # BLD AUTO: 0.77 10*3/MM3 (ref 0.25–0.8)
MONOCYTES NFR BLD AUTO: 6.8 % (ref 4–12)
NEUTROPHILS # BLD AUTO: 1.8 10*3/MM3 (ref 1.5–7)
NEUTROPHILS NFR BLD AUTO: 15.9 % (ref 39–75)
NRBC BLD MANUAL-RTO: 0 /100 WBC (ref 0–0)
PLATELET # BLD AUTO: 84 10*3/MM3 (ref 150–375)
PMV BLD AUTO: 11.1 FL (ref 8.9–12.1)
PROTHROMBIN TIME: 23.8 SECONDS (ref 11–13.5)
RBC # BLD AUTO: 4.44 10*6/MM3 (ref 3.9–5)
WBC NRBC COR # BLD: 11.32 10*3/MM3 (ref 4–10)

## 2017-08-02 PROCEDURE — 85610 PROTHROMBIN TIME: CPT | Performed by: INTERNAL MEDICINE

## 2017-08-02 PROCEDURE — 85025 COMPLETE CBC W/AUTO DIFF WBC: CPT | Performed by: INTERNAL MEDICINE

## 2017-08-02 PROCEDURE — 36415 COLL VENOUS BLD VENIPUNCTURE: CPT | Performed by: INTERNAL MEDICINE

## 2017-08-02 NOTE — PROGRESS NOTES
CBC AND INR REVIEWED WITH PATIENT. PLTS STABLE AT 84K. INR 2.0. PATIENT DENIES ANY BLEEDING. DOSE ADJUSTED SLIGHTLY PER SS PROTOCOL. REVIEWED FUTURE APPTS WITH PATIENT AND SHE V/U

## 2017-09-13 ENCOUNTER — ANTICOAGULATION VISIT (OUTPATIENT)
Dept: LAB | Facility: HOSPITAL | Age: 82
End: 2017-09-13

## 2017-09-13 ENCOUNTER — CLINICAL SUPPORT (OUTPATIENT)
Dept: ONCOLOGY | Facility: HOSPITAL | Age: 82
End: 2017-09-13

## 2017-09-13 DIAGNOSIS — Z79.01 LONG TERM CURRENT USE OF ANTICOAGULANT: Primary | ICD-10-CM

## 2017-09-13 LAB
BASOPHILS # BLD AUTO: 0.02 10*3/MM3 (ref 0–0.1)
BASOPHILS NFR BLD AUTO: 0.2 % (ref 0–1.1)
DEPRECATED RDW RBC AUTO: 47.7 FL (ref 37–49)
EOSINOPHIL # BLD AUTO: 0.08 10*3/MM3 (ref 0–0.36)
EOSINOPHIL NFR BLD AUTO: 0.8 % (ref 1–5)
ERYTHROCYTE [DISTWIDTH] IN BLOOD BY AUTOMATED COUNT: 13.9 % (ref 11.7–14.5)
HCT VFR BLD AUTO: 40.4 % (ref 34–45)
HGB BLD-MCNC: 13.2 G/DL (ref 11.5–14.9)
IMM GRANULOCYTES # BLD: 0.02 10*3/MM3 (ref 0–0.03)
IMM GRANULOCYTES NFR BLD: 0.2 % (ref 0–0.5)
INR PPP: 3.1 (ref 0.9–1.1)
LYMPHOCYTES # BLD AUTO: 7.05 10*3/MM3 (ref 1–3.5)
LYMPHOCYTES NFR BLD AUTO: 74.3 % (ref 20–49)
MCH RBC QN AUTO: 30.5 PG (ref 27–33)
MCHC RBC AUTO-ENTMCNC: 32.7 G/DL (ref 32–35)
MCV RBC AUTO: 93.3 FL (ref 83–97)
MONOCYTES # BLD AUTO: 0.71 10*3/MM3 (ref 0.25–0.8)
MONOCYTES NFR BLD AUTO: 7.5 % (ref 4–12)
NEUTROPHILS # BLD AUTO: 1.61 10*3/MM3 (ref 1.5–7)
NEUTROPHILS NFR BLD AUTO: 17 % (ref 39–75)
NRBC BLD MANUAL-RTO: 0 /100 WBC (ref 0–0)
PLATELET # BLD AUTO: 87 10*3/MM3 (ref 150–375)
PMV BLD AUTO: 11.7 FL (ref 8.9–12.1)
PROTHROMBIN TIME: 37.7 SECONDS (ref 11–13.5)
RBC # BLD AUTO: 4.33 10*6/MM3 (ref 3.9–5)
WBC NRBC COR # BLD: 9.49 10*3/MM3 (ref 4–10)

## 2017-09-13 PROCEDURE — 85025 COMPLETE CBC W/AUTO DIFF WBC: CPT | Performed by: INTERNAL MEDICINE

## 2017-09-13 PROCEDURE — 36416 COLLJ CAPILLARY BLOOD SPEC: CPT | Performed by: INTERNAL MEDICINE

## 2017-09-13 PROCEDURE — 85610 PROTHROMBIN TIME: CPT | Performed by: INTERNAL MEDICINE

## 2017-09-13 NOTE — PROGRESS NOTES
Pt's INR 3.1 today. Plts 87K. INR goal 2-2.5. Pt denies any medication changes or diet changes. She has been taking coumadin per  protocol. Placed in SS. Per , pt should now take 4mg Sun, 3mg all other days. Pt not scheduled to return until 10/24. Reviewed all with Dr. Velez. Per Dr. Velez, OK with this return date. Copy of labs and coumadin dosing schedule given to pt.

## 2017-09-27 RX ORDER — WARFARIN SODIUM 2 MG/1
TABLET ORAL
Qty: 90 TABLET | Refills: 0 | Status: SHIPPED | OUTPATIENT
Start: 2017-09-27 | End: 2017-11-20 | Stop reason: SDUPTHER

## 2017-10-05 DIAGNOSIS — Z00.00 HEALTH CARE MAINTENANCE: Primary | ICD-10-CM

## 2017-10-05 DIAGNOSIS — R73.03 PREDIABETES: ICD-10-CM

## 2017-10-05 DIAGNOSIS — I10 BENIGN ESSENTIAL HTN: ICD-10-CM

## 2017-10-05 DIAGNOSIS — N18.30 CHRONIC KIDNEY DISEASE (CKD), STAGE III (MODERATE) (HCC): ICD-10-CM

## 2017-10-05 DIAGNOSIS — E78.5 HYPERLIPIDEMIA, UNSPECIFIED HYPERLIPIDEMIA TYPE: ICD-10-CM

## 2017-10-05 LAB — UNABLE TO VOID: NORMAL

## 2017-10-05 RX ORDER — MELOXICAM 15 MG/1
TABLET ORAL
Qty: 30 TABLET | Refills: 2 | Status: SHIPPED | OUTPATIENT
Start: 2017-10-05 | End: 2018-02-05 | Stop reason: SDUPTHER

## 2017-10-06 LAB
ALBUMIN SERPL-MCNC: 3.9 G/DL (ref 3.5–5.2)
ALBUMIN/GLOB SERPL: 1.6 G/DL
ALP SERPL-CCNC: 48 U/L (ref 39–117)
ALT SERPL-CCNC: 6 U/L (ref 1–33)
AST SERPL-CCNC: 12 U/L (ref 1–32)
BASOPHILS # BLD MANUAL: 0 10*3/MM3 (ref 0–0.2)
BASOPHILS NFR BLD MANUAL: 0 % (ref 0–1.5)
BILIRUB SERPL-MCNC: 0.7 MG/DL (ref 0.1–1.2)
BUN SERPL-MCNC: 22 MG/DL (ref 8–23)
BUN/CREAT SERPL: 20.6 (ref 7–25)
CALCIUM SERPL-MCNC: 9.5 MG/DL (ref 8.2–9.6)
CHLORIDE SERPL-SCNC: 108 MMOL/L (ref 98–107)
CHOLEST SERPL-MCNC: 169 MG/DL (ref 0–200)
CO2 SERPL-SCNC: 22.4 MMOL/L (ref 22–29)
CREAT SERPL-MCNC: 1.07 MG/DL (ref 0.57–1)
DIFFERENTIAL COMMENT: ABNORMAL
EOSINOPHIL # BLD MANUAL: 0 10*3/MM3 (ref 0–0.7)
EOSINOPHIL NFR BLD MANUAL: 0 % (ref 0.3–6.2)
ERYTHROCYTE [DISTWIDTH] IN BLOOD BY AUTOMATED COUNT: 14.4 % (ref 11.7–13)
GLOBULIN SER CALC-MCNC: 2.4 GM/DL
GLUCOSE SERPL-MCNC: 100 MG/DL (ref 65–99)
HBA1C MFR BLD: 5.29 % (ref 4.8–5.6)
HCT VFR BLD AUTO: 42.4 % (ref 35.6–45.5)
HDLC SERPL-MCNC: 41 MG/DL (ref 40–60)
HGB BLD-MCNC: 13.3 G/DL (ref 11.9–15.5)
LDLC SERPL CALC-MCNC: 105 MG/DL (ref 0–100)
LYMPHOCYTES # BLD MANUAL: 7.77 10*3/MM3 (ref 0.9–4.8)
LYMPHOCYTES NFR BLD MANUAL: 73 % (ref 19.6–45.3)
MCH RBC QN AUTO: 30.3 PG (ref 26.9–32)
MCHC RBC AUTO-ENTMCNC: 31.4 G/DL (ref 32.4–36.3)
MCV RBC AUTO: 96.6 FL (ref 80.5–98.2)
MONOCYTES # BLD MANUAL: 0.43 10*3/MM3 (ref 0.2–1.2)
MONOCYTES NFR BLD MANUAL: 4 % (ref 5–12)
NEUTROPHILS # BLD MANUAL: 2.45 10*3/MM3 (ref 1.9–8.1)
NEUTROPHILS NFR BLD MANUAL: 23 % (ref 42.7–76)
PLATELET # BLD AUTO: 104 10*3/MM3 (ref 140–500)
PLATELET BLD QL SMEAR: ABNORMAL
POTASSIUM SERPL-SCNC: 4.1 MMOL/L (ref 3.5–5.2)
PROT SERPL-MCNC: 6.3 G/DL (ref 6–8.5)
RBC # BLD AUTO: 4.39 10*6/MM3 (ref 3.9–5.2)
RBC MORPH BLD: ABNORMAL
SODIUM SERPL-SCNC: 145 MMOL/L (ref 136–145)
TRIGL SERPL-MCNC: 116 MG/DL (ref 0–150)
TSH SERPL DL<=0.005 MIU/L-ACNC: 2.48 MIU/ML (ref 0.27–4.2)
VLDLC SERPL CALC-MCNC: 23.2 MG/DL (ref 5–40)
WBC # BLD AUTO: 10.65 10*3/MM3 (ref 4.5–10.7)

## 2017-10-11 ENCOUNTER — OFFICE VISIT (OUTPATIENT)
Dept: INTERNAL MEDICINE | Facility: CLINIC | Age: 82
End: 2017-10-11

## 2017-10-11 VITALS
RESPIRATION RATE: 18 BRPM | OXYGEN SATURATION: 99 % | SYSTOLIC BLOOD PRESSURE: 124 MMHG | DIASTOLIC BLOOD PRESSURE: 68 MMHG | WEIGHT: 180 LBS | HEIGHT: 63 IN | HEART RATE: 64 BPM | BODY MASS INDEX: 31.89 KG/M2

## 2017-10-11 DIAGNOSIS — I10 BENIGN ESSENTIAL HTN: ICD-10-CM

## 2017-10-11 DIAGNOSIS — N18.30 CHRONIC KIDNEY DISEASE (CKD), STAGE III (MODERATE) (HCC): ICD-10-CM

## 2017-10-11 DIAGNOSIS — R73.03 PREDIABETES: ICD-10-CM

## 2017-10-11 DIAGNOSIS — Z00.00 MEDICARE ANNUAL WELLNESS VISIT, INITIAL: Primary | ICD-10-CM

## 2017-10-11 DIAGNOSIS — Z23 NEED FOR INFLUENZA VACCINATION: ICD-10-CM

## 2017-10-11 PROCEDURE — G0008 ADMIN INFLUENZA VIRUS VAC: HCPCS | Performed by: INTERNAL MEDICINE

## 2017-10-11 PROCEDURE — G0438 PPPS, INITIAL VISIT: HCPCS | Performed by: INTERNAL MEDICINE

## 2017-10-11 PROCEDURE — 90662 IIV NO PRSV INCREASED AG IM: CPT | Performed by: INTERNAL MEDICINE

## 2017-10-11 PROCEDURE — 99213 OFFICE O/P EST LOW 20 MIN: CPT | Performed by: INTERNAL MEDICINE

## 2017-10-11 NOTE — PATIENT INSTRUCTIONS
Medicare Wellness  Personal Prevention Plan of Service     Date of Office Visit:  10/11/2017  Encounter Provider:  Lena Yip MD  Place of Service:  [facility] INTERNAL MEDICINE  Patient Name: Virginia Benavidez  :  1925    As part of the Medicare Wellness portion of your visit today, we are providing you with this personalized preventive plan of services (PPPS). This plan is based upon recommendations of the [place] Preventive Services Task Force (USPSTF) and the Advisory Committee on Immunization Practices (ACIP).    This lists the preventive care services that should be considered, and provides dates of when you are due. Items listed as completed are up-to-date and do not require any further intervention.    Health Maintenance   Topic Date Due   • MEDICARE ANNUAL WELLNESS  2016   • INFLUENZA VACCINE  2017   • PNEUMOCOCCAL VACCINES (65+ LOW/MEDIUM RISK) (2 of 2 - PPSV23) 08/10/2017   • LIPID PANEL  10/05/2018   • MAMMOGRAM  2018   • TDAP/TD VACCINES (2 - Td) 08/10/2026   • ZOSTER VACCINE  Addressed       No orders of the defined types were placed in this encounter.      No Follow-up on file.

## 2017-10-11 NOTE — PROGRESS NOTES
Medicare Annual Wellness Visit    Chief Complaint:  Annual Exam (Initial AWV)      History of Present Illness    Virginia Benavidez is a 89 y.o. female who presents for an Annual Wellness Visit. In addition, we addressed the following health issues:    Mammo:12/5/2016  Pap:n/a  C-scope:n/a  Flu shot: will get this today  Prevnar: declines  Pneumovax:declines  Dental Exam: long time ago - she doesn't want to go because they will tell her she needs her teeth pulled and she doesn't want dentures  Eye Exam: yesterday - she goes q6mo  Exercise: none    Advanced Care Planning:  has an advance directive - a copy HAS NOT been provided   Immunization History   Administered Date(s) Administered   • Influenza, Unspecified 09/01/2012, 01/12/2015     Virginia is here for routine follow up on her bp and ckd and prediabetes.  She stopped taking her lisinopril at some point.  She didn't think she needed it any longer.  We discussed her labs and her improvement in her A1c and her kidney function.  She generally feels ok.  She gets tired easily but she will be 89 in a month.  She rests when she needs to.  She does get short of breath when she really pushes herself and has occasional palpitations.  She has some crusty skin lesions on her arms and scalp and legs but she doesn't want to see a dermatologist for these.        Review of Systems   Constitution: Positive for malaise/fatigue (age related). Negative for chills and fever.   HENT: Positive for hearing loss. Negative for hoarse voice and sore throat.    Eyes: Negative for blurred vision, double vision and visual disturbance.   Cardiovascular: Positive for dyspnea on exertion and palpitations (occ). Negative for chest pain and leg swelling.   Respiratory: Negative for cough and shortness of breath.    Endocrine: Negative for polydipsia and polyuria.   Hematologic/Lymphatic: Does not bruise/bleed easily.   Skin: Positive for dry skin and suspicious lesions (top of head). Negative for  rash.   Musculoskeletal: Negative for arthritis and myalgias.   Gastrointestinal: Negative for abdominal pain, change in bowel habit, constipation, diarrhea, dysphagia, hematochezia, melena, nausea and vomiting.   Genitourinary: Positive for frequency. Negative for dysuria and hematuria.   Neurological: Negative for dizziness, headaches and light-headedness.   Psychiatric/Behavioral: Negative for depression. The patient is not nervous/anxious.        Past Medical History:   Diagnosis Date   • Antiphospholipid syndrome    • Atresia (acquired) of cervix     atresia   • Breast cancer     2005 with lumpectomy   • Chronic kidney disease     Stage III   • Chronic lymphocytic leukemia    • Cystocele    • Deep vein thrombosis of lower extremity    • Glaucoma    • History of radiation therapy    • Hyperlipidemia    • Hypertension    • Osteoarthritis    • Thrombocytopenia        Past Surgical History:   Procedure Laterality Date   • BACK SURGERY     • BREAST BIOPSY  1992   • BREAST LUMPECTOMY     • CHOLECYSTECTOMY         Social History     Social History   • Marital status: Single     Spouse name: N/A   • Number of children: 3   • Years of education: N/A     Occupational History   •  Retired     Social History Main Topics   • Smoking status: Never Smoker   • Smokeless tobacco: Never Used   • Alcohol use Yes      Comment: Occasionally   • Drug use: Not on file   • Sexual activity: Not on file     Other Topics Concern   • Not on file     Social History Narrative       Family History   Problem Relation Age of Onset   • Alzheimer's disease Mother    • Emphysema Father    • Lung cancer Sister        Allergies   Allergen Reactions   • Morphine And Related    • Penicillins        Current Outpatient Prescriptions on File Prior to Visit   Medication Sig Dispense Refill   • diclofenac (VOLTAREN) 1 % gel gel Place on the skin. Apply 4 grams to lower extremeties bid as needed     • dorzolamide (TRUSOPT) 2 % ophthalmic solution 1 drop 3  "(three) times a day.     • lisinopril (PRINIVIL,ZESTRIL) 40 MG tablet TAKE ONE TABLET BY MOUTH DAILY 90 tablet 2   • meloxicam (MOBIC) 15 MG tablet TAKE ONE TABLET BY MOUTH DAILY WITH FOOD 30 tablet 2   • timolol (TIMOPTIC) 0.25 % ophthalmic solution 1 drop 2 (two) times a day.     • warfarin (COUMADIN) 2 MG tablet 4mg S/T/TH and 3mg all other days or as directed by MD. 90 tablet 0   • furosemide (LASIX) 20 MG tablet Take 0.5 tablets by mouth Daily As Needed (edema). 15 tablet 3     No current facility-administered medications on file prior to visit.        Patient Active Problem List   Diagnosis   • Chronic kidney disease (CKD), stage III (moderate)   • Benign essential HTN   • Gait instability   • HLD (hyperlipidemia)   • Prediabetes   • Degenerative joint disease involving multiple joints   • Disorder of peripheral nervous system   • Primary osteoarthritis involving multiple joints   • Long term current use of anticoagulant   • Gout of big toe   • Bilateral lower extremity edema   • Thrombophilia       Health Risk Assessment/Depression Screen/Functional and Cognitive Screening:   The patient has completed a Health Risk Assessment & Depression screen. These have been reviewed with them and have been scanned as a separate documents.    Age-appropriate Screening Schedule:  Refer to the list below for future screening recommendations based on patient's age. Orders for these recommended tests are listed in the plan section. The patient has been provided with a written plan.     I have reviewed their problem list, past medical history, family history, social history, and surgical history.     Vitals:    10/11/17 0824   BP: 124/68   Pulse: 64   Resp: 18   SpO2: 99%   Weight: 180 lb (81.6 kg)   Height: 62.6\" (159 cm)   PainSc: 0-No pain       Body mass index is 32.29 kg/(m^2).    Physical Exam   Constitutional: She is oriented to person, place, and time. She appears well-developed and well-nourished. No distress.   HENT: "   Head: Normocephalic and atraumatic.   Eyes: Conjunctivae and EOM are normal. Pupils are equal, round, and reactive to light. No scleral icterus.   Neck: Normal range of motion. Neck supple.   Cardiovascular: Normal rate, regular rhythm and intact distal pulses.    Pulmonary/Chest: Effort normal and breath sounds normal. No respiratory distress. She has no wheezes. She has no rales.   Abdominal: Soft. She exhibits no distension. There is no tenderness.   Musculoskeletal: She exhibits edema (bilateral chronic venous insufficiency with stasis dermatitis).   Lymphadenopathy:     She has no cervical adenopathy.   Neurological: She is alert and oriented to person, place, and time. No cranial nerve deficit.   Skin: Rash (age spots and dry crusty lesions on arms, legs, and scalp.  large raised crusted lesion on scalp) noted.   Psychiatric: She has a normal mood and affect. Her behavior is normal.       Results for orders placed or performed in visit on 10/05/17   Comprehensive Metabolic Panel   Result Value Ref Range    Glucose 100 (H) 65 - 99 mg/dL    BUN 22 8 - 23 mg/dL    Creatinine 1.07 (H) 0.57 - 1.00 mg/dL    eGFR Non African Am 48 (L) >60 mL/min/1.73    eGFR African Am 58 (L) >60 mL/min/1.73    BUN/Creatinine Ratio 20.6 7.0 - 25.0    Sodium 145 136 - 145 mmol/L    Potassium 4.1 3.5 - 5.2 mmol/L    Chloride 108 (H) 98 - 107 mmol/L    Total CO2 22.4 22.0 - 29.0 mmol/L    Calcium 9.5 8.2 - 9.6 mg/dL    Total Protein 6.3 6.0 - 8.5 g/dL    Albumin 3.90 3.50 - 5.20 g/dL    Globulin 2.4 gm/dL    A/G Ratio 1.6 g/dL    Total Bilirubin 0.7 0.1 - 1.2 mg/dL    Alkaline Phosphatase 48 39 - 117 U/L    AST (SGOT) 12 1 - 32 U/L    ALT (SGPT) 6 1 - 33 U/L   Lipid Panel   Result Value Ref Range    Total Cholesterol 169 0 - 200 mg/dL    Triglycerides 116 0 - 150 mg/dL    HDL Cholesterol 41 40 - 60 mg/dL    VLDL Cholesterol 23.2 5 - 40 mg/dL    LDL Cholesterol  105 (H) 0 - 100 mg/dL   TSH Rfx On Abnormal To Free T4   Result Value Ref  Range    TSH 2.48 0.27 - 4.2 mIU/mL   Hemoglobin A1c   Result Value Ref Range    Hemoglobin A1C 5.29 4.80 - 5.60 %   Manual Differential   Result Value Ref Range    Neutrophil Rel % 23.0 (L) 42.7 - 76.0 %    Lymphocyte Rel % 73.0 (H) 19.6 - 45.3 %    Monocyte Rel % 4.0 (L) 5.0 - 12.0 %    Eosinophil Rel % 0.0 (L) 0.3 - 6.2 %    Basophil Rel % 0.0 0.0 - 1.5 %    Neutrophils Absolute 2.45 1.90 - 8.10 10*3/mm3    Lymphocytes Absolute 7.77 (H) 0.90 - 4.80 10*3/mm3    Monocytes Absolute 0.43 0.20 - 1.20 10*3/mm3    Eosinophil Abs 0.00 0.00 - 0.70 10*3/mm3    Basophils Absolute 0.00 0.00 - 0.20 10*3/mm3    Differential Comment Comment     Comment Comment     Plt Comment Comment    Unable To Void   Result Value Ref Range    Unable to Void Comment    CBC & Differential   Result Value Ref Range    WBC 10.65 4.50 - 10.70 10*3/mm3    RBC 4.39 3.90 - 5.20 10*6/mm3    Hemoglobin 13.3 11.9 - 15.5 g/dL    Hematocrit 42.4 35.6 - 45.5 %    MCV 96.6 80.5 - 98.2 fL    MCH 30.3 26.9 - 32.0 pg    MCHC 31.4 (L) 32.4 - 36.3 g/dL    RDW 14.4 (H) 11.7 - 13.0 %    Platelets 104 (L) 140 - 500 10*3/mm3       Assessment & Plan:    Diagnoses and all orders for this visit:    Medicare annual wellness visit, initial    Benign essential HTN    Chronic kidney disease (CKD), stage III (moderate)    Prediabetes        Discussion/Summary  Virginia is here for AWV and follow up.  She declines pneumonia shots.  She did want a flu shot today.  She stopped her bp med.  Today on exam her bp is ok.  Will keep her off of this.  She also follows up with [facility].  Her kidney function is very stable.  Her A1c has improved over the years.  She is generally doing well.  She turns 89 in a month.  Advised to just keep doing what she is doing.  Everything is stable.        Follow Up:  No Follow-up on file.     An After Visit Summary and PPPS with all of these plans were given to the patient.

## 2017-11-02 ENCOUNTER — TRANSCRIBE ORDERS (OUTPATIENT)
Dept: ADMINISTRATIVE | Facility: HOSPITAL | Age: 82
End: 2017-11-02

## 2017-11-02 ENCOUNTER — OFFICE VISIT (OUTPATIENT)
Dept: ONCOLOGY | Facility: CLINIC | Age: 82
End: 2017-11-02

## 2017-11-02 ENCOUNTER — ANTICOAGULATION VISIT (OUTPATIENT)
Dept: LAB | Facility: HOSPITAL | Age: 82
End: 2017-11-02

## 2017-11-02 VITALS
RESPIRATION RATE: 18 BRPM | HEART RATE: 57 BPM | WEIGHT: 185 LBS | BODY MASS INDEX: 32.78 KG/M2 | OXYGEN SATURATION: 97 % | DIASTOLIC BLOOD PRESSURE: 64 MMHG | SYSTOLIC BLOOD PRESSURE: 162 MMHG | TEMPERATURE: 97.5 F | HEIGHT: 63 IN

## 2017-11-02 DIAGNOSIS — N18.30 CHRONIC KIDNEY DISEASE (CKD), STAGE III (MODERATE) (HCC): Primary | ICD-10-CM

## 2017-11-02 DIAGNOSIS — D68.59 THROMBOPHILIA (HCC): Primary | ICD-10-CM

## 2017-11-02 DIAGNOSIS — Z79.01 LONG TERM CURRENT USE OF ANTICOAGULANT: ICD-10-CM

## 2017-11-02 DIAGNOSIS — Z12.31 SCREENING MAMMOGRAM, ENCOUNTER FOR: Primary | ICD-10-CM

## 2017-11-02 LAB
BASOPHILS # BLD AUTO: 0.02 10*3/MM3 (ref 0–0.1)
BASOPHILS NFR BLD AUTO: 0.2 % (ref 0–1.1)
DEPRECATED RDW RBC AUTO: 46.9 FL (ref 37–49)
EOSINOPHIL # BLD AUTO: 0.08 10*3/MM3 (ref 0–0.36)
EOSINOPHIL NFR BLD AUTO: 0.8 % (ref 1–5)
ERYTHROCYTE [DISTWIDTH] IN BLOOD BY AUTOMATED COUNT: 13.9 % (ref 11.7–14.5)
HCT VFR BLD AUTO: 39.8 % (ref 34–45)
HGB BLD-MCNC: 12.9 G/DL (ref 11.5–14.9)
IMM GRANULOCYTES # BLD: 0.02 10*3/MM3 (ref 0–0.03)
IMM GRANULOCYTES NFR BLD: 0.2 % (ref 0–0.5)
INR PPP: 3.1 (ref 0.9–1.1)
LYMPHOCYTES # BLD AUTO: 7.21 10*3/MM3 (ref 1–3.5)
LYMPHOCYTES NFR BLD AUTO: 69.1 % (ref 20–49)
MCH RBC QN AUTO: 30.4 PG (ref 27–33)
MCHC RBC AUTO-ENTMCNC: 32.4 G/DL (ref 32–35)
MCV RBC AUTO: 93.9 FL (ref 83–97)
MONOCYTES # BLD AUTO: 0.6 10*3/MM3 (ref 0.25–0.8)
MONOCYTES NFR BLD AUTO: 5.7 % (ref 4–12)
NEUTROPHILS # BLD AUTO: 2.51 10*3/MM3 (ref 1.5–7)
NEUTROPHILS NFR BLD AUTO: 24 % (ref 39–75)
NRBC BLD MANUAL-RTO: 0 /100 WBC (ref 0–0)
PLATELET # BLD AUTO: 82 10*3/MM3 (ref 150–375)
PMV BLD AUTO: 11.6 FL (ref 8.9–12.1)
PROTHROMBIN TIME: 37.3 SECONDS (ref 11–13.5)
RBC # BLD AUTO: 4.24 10*6/MM3 (ref 3.9–5)
WBC NRBC COR # BLD: 10.44 10*3/MM3 (ref 4–10)

## 2017-11-02 PROCEDURE — 85610 PROTHROMBIN TIME: CPT | Performed by: INTERNAL MEDICINE

## 2017-11-02 PROCEDURE — 85025 COMPLETE CBC W/AUTO DIFF WBC: CPT | Performed by: INTERNAL MEDICINE

## 2017-11-02 PROCEDURE — 99214 OFFICE O/P EST MOD 30 MIN: CPT | Performed by: INTERNAL MEDICINE

## 2017-11-02 PROCEDURE — 36415 COLL VENOUS BLD VENIPUNCTURE: CPT | Performed by: INTERNAL MEDICINE

## 2017-11-02 NOTE — PROGRESS NOTES
Subjective .     REASONS FOR FOLLOWUP:    CLL  Thrombophilia  Thrombocytopenia    History of Present Illness   Mrs. Benavidez returns today with no new complaints. She is looking forward to her 89nd birthday and a trip to the [employer] boat to celebrate.    Past Medical History:   Diagnosis Date   • Antiphospholipid syndrome    • Atresia (acquired) of cervix     atresia   • Breast cancer     2005 with lumpectomy   • Chronic kidney disease     Stage III   • Chronic lymphocytic leukemia    • Cystocele    • Deep vein thrombosis of lower extremity    • Glaucoma    • Gout of big toe 8/10/2016   • History of radiation therapy    • Hyperlipidemia    • Hypertension    • Osteoarthritis    • Thrombocytopenia        ONCOLOGIC HISTORY:  (History from previous dates can be found in the separate document.)    Current Outpatient Prescriptions on File Prior to Visit   Medication Sig Dispense Refill   • diclofenac (VOLTAREN) 1 % gel gel Place on the skin. Apply 4 grams to lower extremeties bid as needed     • dorzolamide (TRUSOPT) 2 % ophthalmic solution 1 drop 3 (three) times a day.     • lisinopril (PRINIVIL,ZESTRIL) 40 MG tablet TAKE ONE TABLET BY MOUTH DAILY 90 tablet 2   • meloxicam (MOBIC) 15 MG tablet TAKE ONE TABLET BY MOUTH DAILY WITH FOOD 30 tablet 2   • timolol (TIMOPTIC) 0.25 % ophthalmic solution 1 drop 2 (two) times a day.     • warfarin (COUMADIN) 2 MG tablet 4mg S/T/TH and 3mg all other days or as directed by MD. 90 tablet 0     No current facility-administered medications on file prior to visit.        ALLERGIES:     Allergies   Allergen Reactions   • Morphine And Related    • Penicillins        Social History     Social History   • Marital status: Single     Spouse name: N/A   • Number of children: 3   • Years of education: N/A     Occupational History   •  Retired     Social History Main Topics   • Smoking status: Never Smoker   • Smokeless tobacco: Never Used   • Alcohol use Yes      Comment: Occasionally   • Drug use:  "Not on file   • Sexual activity: Not on file     Other Topics Concern   • Not on file     Social History Narrative         Cancer-related family history includes Lung cancer in her sister.     Review of Systems  A comprehensive 14 point review of systems was performed and was negative except as mentioned.  Occasional gum-bleeding    Objective      Vitals:    11/02/17 0747   BP: 162/64   Pulse: 57   Resp: 18   Temp: 97.5 °F (36.4 °C)   TempSrc: Oral   SpO2: 97%   Weight: 185 lb (83.9 kg)   Height: 62.6\" (159 cm)   PainSc: 0-No pain     Current Status 11/2/2017   ECOG score 1       Physical Exam     GENERAL:  She is transported via wheelchair  SKIN:  Warm, dry without rashes, purpura or petechiae.  EYES:  Pupils equal, round and reactive to light.  EOMs intact.  Conjunctivae normal.  EARS:  Hearing intact.  NOSE:  Septum midline.  No excoriations or nasal discharge.  MOUTH:  Tongue is well-papillated; no stomatitis or ulcers.  Lips normal.  THROAT:  Oropharynx without lesions or exudates.  NECK:  Supple with good range of motion; no thyromegaly or masses, no JVD.  LYMPHATICS:  No cervical, supraclavicular, axillary or inguinal adenopathy.  CHEST:  Lungs clear to auscultation. Good airflow.  CARDIAC:  Regular rate and rhythm without murmurs, rubs or gallops. Normal S1,S2.  ABDOMEN:  Soft, nontender with no hepatosplenomegaly or masses.  EXTREMITIES:  No clubbing, cyanosis or edema.  She has left hand atresia  NEUROLOGICAL:  Cranial Nerves II-XII grossly intact.  No focal neurological deficits.  PSYCHIATRIC:  Normal affect and mood.      RECENT LABS:  Hematology WBC   Date Value Ref Range Status   11/02/2017 10.44 (H) 4.00 - 10.00 10*3/mm3 Final     RBC   Date Value Ref Range Status   11/02/2017 4.24 3.90 - 5.00 10*6/mm3 Final     Hemoglobin   Date Value Ref Range Status   11/02/2017 12.9 11.5 - 14.9 g/dL Final     Hematocrit   Date Value Ref Range Status   11/02/2017 39.8 34.0 - 45.0 % Final     Platelets   Date Value " Ref Range Status   11/02/2017 82 (L) 150 - 375 10*3/mm3 Final   IgM anticardiolipin antibody and IgM antiplatelet beta-2 GP antibodies are positive      Assessment/Plan   Thrombophilia: At this point we'll target INR 2-2 0.5 particularly in light of her platelet count of 82,000 which is baseline.   I have recommended one and a half tablets with 6 week follow up INR and then MD at 6 months.    CLL:stable counts and I believe this will be a non-issue                Cc:  Alexandro Lomeli MD

## 2017-11-20 RX ORDER — WARFARIN SODIUM 2 MG/1
TABLET ORAL
Qty: 90 TABLET | Refills: 3 | Status: SHIPPED | OUTPATIENT
Start: 2017-11-20 | End: 2018-08-28 | Stop reason: SDUPTHER

## 2017-11-28 ENCOUNTER — TELEPHONE (OUTPATIENT)
Dept: INTERNAL MEDICINE | Facility: CLINIC | Age: 82
End: 2017-11-28

## 2017-12-08 ENCOUNTER — HOSPITAL ENCOUNTER (OUTPATIENT)
Dept: MAMMOGRAPHY | Facility: HOSPITAL | Age: 82
Discharge: HOME | End: 2017-12-08
Admitting: INTERNAL MEDICINE

## 2017-12-08 DIAGNOSIS — Z12.31 SCREENING MAMMOGRAM, ENCOUNTER FOR: ICD-10-CM

## 2017-12-08 PROCEDURE — G0202 SCR MAMMO BI INCL CAD: HCPCS

## 2017-12-14 ENCOUNTER — LAB (OUTPATIENT)
Dept: LAB | Facility: HOSPITAL | Age: 82
End: 2017-12-14

## 2017-12-14 ENCOUNTER — CLINICAL SUPPORT (OUTPATIENT)
Dept: ONCOLOGY | Facility: HOSPITAL | Age: 82
End: 2017-12-14

## 2017-12-14 DIAGNOSIS — N18.30 CHRONIC KIDNEY DISEASE (CKD), STAGE III (MODERATE) (HCC): Primary | ICD-10-CM

## 2017-12-14 DIAGNOSIS — Z79.01 LONG TERM CURRENT USE OF ANTICOAGULANT: ICD-10-CM

## 2017-12-14 LAB
INR PPP: 3.1 (ref 0.9–1.1)
PROTHROMBIN TIME: 37.1 SECONDS (ref 11–13.5)

## 2017-12-14 PROCEDURE — 85610 PROTHROMBIN TIME: CPT

## 2018-01-25 ENCOUNTER — LAB (OUTPATIENT)
Dept: LAB | Facility: HOSPITAL | Age: 83
End: 2018-01-25

## 2018-01-25 ENCOUNTER — CLINICAL SUPPORT (OUTPATIENT)
Dept: ONCOLOGY | Facility: HOSPITAL | Age: 83
End: 2018-01-25

## 2018-01-25 DIAGNOSIS — Z79.01 LONG TERM CURRENT USE OF ANTICOAGULANT: ICD-10-CM

## 2018-01-25 DIAGNOSIS — N18.30 CHRONIC KIDNEY DISEASE (CKD), STAGE III (MODERATE) (HCC): Primary | ICD-10-CM

## 2018-01-25 LAB
INR PPP: 2.1 (ref 0.9–1.1)
PROTHROMBIN TIME: 25 SECONDS (ref 11–13.5)

## 2018-01-25 PROCEDURE — 85610 PROTHROMBIN TIME: CPT

## 2018-01-25 NOTE — PROGRESS NOTES
Pt here Coag review. INR 2.1. Pt hasn't missed any doses and isn't taking any new meds. Per SS Coumadin dose remains the same. Copy of schedule given to pt and verified upcoming appts.

## 2018-02-06 RX ORDER — MELOXICAM 15 MG/1
TABLET ORAL
Qty: 30 TABLET | Refills: 3 | Status: SHIPPED | OUTPATIENT
Start: 2018-02-06 | End: 2018-06-10 | Stop reason: SDUPTHER

## 2018-03-08 ENCOUNTER — LAB (OUTPATIENT)
Dept: LAB | Facility: HOSPITAL | Age: 83
End: 2018-03-08

## 2018-03-08 ENCOUNTER — CLINICAL SUPPORT (OUTPATIENT)
Dept: ONCOLOGY | Facility: HOSPITAL | Age: 83
End: 2018-03-08

## 2018-03-08 DIAGNOSIS — Z79.01 LONG TERM CURRENT USE OF ANTICOAGULANT: Primary | ICD-10-CM

## 2018-03-08 LAB
INR PPP: 2.2 (ref 0.9–1.1)
PROTHROMBIN TIME: 25.9 SECONDS (ref 11–13.5)

## 2018-03-08 PROCEDURE — 85610 PROTHROMBIN TIME: CPT

## 2018-04-19 ENCOUNTER — OFFICE VISIT (OUTPATIENT)
Dept: ONCOLOGY | Facility: CLINIC | Age: 83
End: 2018-04-19

## 2018-04-19 ENCOUNTER — LAB (OUTPATIENT)
Dept: LAB | Facility: HOSPITAL | Age: 83
End: 2018-04-19

## 2018-04-19 VITALS
OXYGEN SATURATION: 99 % | TEMPERATURE: 97.6 F | SYSTOLIC BLOOD PRESSURE: 172 MMHG | DIASTOLIC BLOOD PRESSURE: 80 MMHG | RESPIRATION RATE: 16 BRPM | HEART RATE: 53 BPM | BODY MASS INDEX: 33.56 KG/M2 | WEIGHT: 189.4 LBS | HEIGHT: 63 IN

## 2018-04-19 DIAGNOSIS — M15.9 PRIMARY OSTEOARTHRITIS INVOLVING MULTIPLE JOINTS: ICD-10-CM

## 2018-04-19 DIAGNOSIS — Z79.01 LONG TERM CURRENT USE OF ANTICOAGULANT: Primary | ICD-10-CM

## 2018-04-19 DIAGNOSIS — D68.59 THROMBOPHILIA (HCC): Primary | ICD-10-CM

## 2018-04-19 LAB
BASOPHILS # BLD AUTO: 0.03 10*3/MM3 (ref 0–0.1)
BASOPHILS NFR BLD AUTO: 0.2 % (ref 0–1.1)
DEPRECATED RDW RBC AUTO: 50.2 FL (ref 37–49)
EOSINOPHIL # BLD AUTO: 0.09 10*3/MM3 (ref 0–0.36)
EOSINOPHIL NFR BLD AUTO: 0.7 % (ref 1–5)
ERYTHROCYTE [DISTWIDTH] IN BLOOD BY AUTOMATED COUNT: 14.5 % (ref 11.7–14.5)
HCT VFR BLD AUTO: 43 % (ref 34–45)
HGB BLD-MCNC: 13.7 G/DL (ref 11.5–14.9)
IMM GRANULOCYTES # BLD: 0.02 10*3/MM3 (ref 0–0.03)
IMM GRANULOCYTES NFR BLD: 0.2 % (ref 0–0.5)
INR PPP: 2.9 (ref 0.9–1.1)
LYMPHOCYTES # BLD AUTO: 8.22 10*3/MM3 (ref 1–3.5)
LYMPHOCYTES NFR BLD AUTO: 68.2 % (ref 20–49)
MCH RBC QN AUTO: 29.9 PG (ref 27–33)
MCHC RBC AUTO-ENTMCNC: 31.9 G/DL (ref 32–35)
MCV RBC AUTO: 93.9 FL (ref 83–97)
MONOCYTES # BLD AUTO: 0.91 10*3/MM3 (ref 0.25–0.8)
MONOCYTES NFR BLD AUTO: 7.6 % (ref 4–12)
NEUTROPHILS # BLD AUTO: 2.78 10*3/MM3 (ref 1.5–7)
NEUTROPHILS NFR BLD AUTO: 23.1 % (ref 39–75)
NRBC BLD MANUAL-RTO: 0 /100 WBC (ref 0–0)
PLATELET # BLD AUTO: 89 10*3/MM3 (ref 150–375)
PMV BLD AUTO: 12.4 FL (ref 8.9–12.1)
PROTHROMBIN TIME: 34.8 SECONDS (ref 11–13.5)
RBC # BLD AUTO: 4.58 10*6/MM3 (ref 3.9–5)
WBC NRBC COR # BLD: 12.05 10*3/MM3 (ref 4–10)

## 2018-04-19 PROCEDURE — 36415 COLL VENOUS BLD VENIPUNCTURE: CPT | Performed by: INTERNAL MEDICINE

## 2018-04-19 PROCEDURE — 85025 COMPLETE CBC W/AUTO DIFF WBC: CPT | Performed by: INTERNAL MEDICINE

## 2018-04-19 PROCEDURE — 99214 OFFICE O/P EST MOD 30 MIN: CPT | Performed by: INTERNAL MEDICINE

## 2018-04-19 PROCEDURE — 85610 PROTHROMBIN TIME: CPT | Performed by: INTERNAL MEDICINE

## 2018-04-19 NOTE — PROGRESS NOTES
Subjective .     REASONS FOR FOLLOWUP:    1. Abnormal mammogram 6/09.    2. Remote history of breast cancer, status post lumpectomy and radiation therapy (right).    3. History of CLL, stage 0.    4. Thrombophilia.      History of Present Illness     Mrs Benavidez returns today with no complaints.    Past Medical History:   Diagnosis Date   • Antiphospholipid syndrome    • Atresia (acquired) of cervix     atresia   • Breast cancer     2005 with lumpectomy   • Chronic kidney disease     Stage III   • Chronic lymphocytic leukemia    • Cystocele    • Deep vein thrombosis of lower extremity    • Glaucoma    • Gout of big toe 8/10/2016   • History of radiation therapy    • Hyperlipidemia    • Hypertension    • Osteoarthritis    • Thrombocytopenia        ONCOLOGIC HISTORY:  (History from previous dates can be found in the separate document.)    Current Outpatient Prescriptions on File Prior to Visit   Medication Sig Dispense Refill   • diclofenac (VOLTAREN) 1 % gel gel Place on the skin. Apply 4 grams to lower extremeties bid as needed     • dorzolamide (TRUSOPT) 2 % ophthalmic solution 1 drop 3 (three) times a day.     • lisinopril (PRINIVIL,ZESTRIL) 40 MG tablet TAKE ONE TABLET BY MOUTH DAILY 90 tablet 2   • meloxicam (MOBIC) 15 MG tablet TAKE ONE TABLET BY MOUTH DAILY WITH FOOD 30 tablet 3   • timolol (TIMOPTIC) 0.25 % ophthalmic solution 1 drop 2 (two) times a day.     • warfarin (COUMADIN) 2 MG tablet Take 1.5 tablets daily, or as instructed by your doctor 90 tablet 3     No current facility-administered medications on file prior to visit.        ALLERGIES:     Allergies   Allergen Reactions   • Morphine And Related    • Penicillins        Social History     Social History   • Marital status: Single     Spouse name: N/A   • Number of children: 3   • Years of education: N/A     Occupational History   •  Retired     Social History Main Topics   • Smoking status: Never Smoker   • Smokeless tobacco: Never Used   • Alcohol  use Yes      Comment: Occasionally   • Drug use: Unknown   • Sexual activity: Not on file     Other Topics Concern   • Not on file     Social History Narrative   • No narrative on file         Cancer-related family history includes Lung cancer in her sister.     Review of Systems  A comprehensive 14 point review of systems was performed and was negative except as mentioned.    Objective      There were no vitals filed for this visit.  Current Status 11/2/2017   ECOG score 1       Physical Exam   Constitutional: She appears well-developed and well-nourished.   HENT:   Head: Normocephalic.   Neck: Neck supple.   Cardiovascular: Normal rate.    Pulmonary/Chest: Effort normal and breath sounds normal.   Abdominal: Soft. She exhibits no mass.   Musculoskeletal: She exhibits no edema.   Lymphadenopathy:     She has no cervical adenopathy.   Skin: Skin is warm and dry.   Psychiatric: She has a normal mood and affect. Judgment and thought content normal.   Vitals reviewed.   Trandsported via wheelchair, with daughter      RECENT LABS:      4/19/2018   WBC 4.00 - 10.00 10*3/mm3 12.05 (A)   Neutrophils, Absolute 1.50 - 7.00 10*3/mm3 2.78   Hemoglobin 11.5 - 14.9 g/dL 13.7   Hematocrit 34.0 - 45.0 % 43.0   Platelets 150 - 375 10*3/mm3 89 (A)   INR 0.90 - 1.10 2.90 (A)   Protime 11.0 - 13.5 Seconds 34.8 (A)     Assessment/Plan    CLL: No B-symptoms or progressive cytopenias. ^month labs and 12 month visit     History of breast ca(2005):no screening studies. WAN     Thrombophilia:lifelong warfarin. Very well-controlled. Every 8 weeks is acceptable for INR review

## 2018-06-11 RX ORDER — MELOXICAM 15 MG/1
TABLET ORAL
Qty: 30 TABLET | Refills: 2 | Status: SHIPPED | OUTPATIENT
Start: 2018-06-11 | End: 2019-01-04 | Stop reason: SDUPTHER

## 2018-06-15 ENCOUNTER — CLINICAL SUPPORT (OUTPATIENT)
Dept: ONCOLOGY | Facility: HOSPITAL | Age: 83
End: 2018-06-15

## 2018-06-15 ENCOUNTER — APPOINTMENT (OUTPATIENT)
Dept: LAB | Facility: HOSPITAL | Age: 83
End: 2018-06-15

## 2018-06-15 DIAGNOSIS — Z79.01 LONG TERM CURRENT USE OF ANTICOAGULANT: Primary | ICD-10-CM

## 2018-06-15 LAB
INR PPP: 2.6 (ref 0.9–1.1)
PROTHROMBIN TIME: 30.9 SECONDS (ref 11–13.5)

## 2018-06-15 PROCEDURE — 85610 PROTHROMBIN TIME: CPT

## 2018-06-15 NOTE — PROGRESS NOTES
INR 2.6. Pt denies any new meds, diet changes or missed doses. Per SS pt is to take 3 mg on Sun,Thurs and 2 mg all other days. Pt V/U.

## 2018-08-10 ENCOUNTER — CLINICAL SUPPORT (OUTPATIENT)
Dept: ONCOLOGY | Facility: HOSPITAL | Age: 83
End: 2018-08-10

## 2018-08-10 ENCOUNTER — APPOINTMENT (OUTPATIENT)
Dept: LAB | Facility: HOSPITAL | Age: 83
End: 2018-08-10

## 2018-08-10 DIAGNOSIS — Z79.01 LONG TERM CURRENT USE OF ANTICOAGULANT: Primary | ICD-10-CM

## 2018-08-10 LAB
INR PPP: 2 (ref 0.9–1.1)
PROTHROMBIN TIME: 23.5 SECONDS (ref 11–13.5)

## 2018-08-10 PROCEDURE — 85610 PROTHROMBIN TIME: CPT

## 2018-08-10 NOTE — PROGRESS NOTES
INR2. Pt denies any missed doses, new meds or diet changes. Per SS pt is to take 3 mg of Coumadin on Sun,Tue,Thurs and 2 mg all other days. Pt V/U.

## 2018-08-29 RX ORDER — WARFARIN SODIUM 2 MG/1
TABLET ORAL
Qty: 90 TABLET | Refills: 2 | Status: SHIPPED | OUTPATIENT
Start: 2018-08-29 | End: 2019-04-08 | Stop reason: SDUPTHER

## 2018-10-05 ENCOUNTER — CLINICAL SUPPORT (OUTPATIENT)
Dept: ONCOLOGY | Facility: HOSPITAL | Age: 83
End: 2018-10-05

## 2018-10-05 ENCOUNTER — APPOINTMENT (OUTPATIENT)
Dept: LAB | Facility: HOSPITAL | Age: 83
End: 2018-10-05

## 2018-10-05 DIAGNOSIS — Z79.01 LONG TERM CURRENT USE OF ANTICOAGULANT: Primary | ICD-10-CM

## 2018-10-05 LAB
BASOPHILS # BLD AUTO: 0.02 10*3/MM3 (ref 0–0.1)
BASOPHILS NFR BLD AUTO: 0.2 % (ref 0–1.1)
DEPRECATED RDW RBC AUTO: 47.5 FL (ref 37–49)
EOSINOPHIL # BLD AUTO: 0.09 10*3/MM3 (ref 0–0.36)
EOSINOPHIL NFR BLD AUTO: 0.8 % (ref 1–5)
ERYTHROCYTE [DISTWIDTH] IN BLOOD BY AUTOMATED COUNT: 14.1 % (ref 11.7–14.5)
HCT VFR BLD AUTO: 41.3 % (ref 34–45)
HGB BLD-MCNC: 13.3 G/DL (ref 11.5–14.9)
IMM GRANULOCYTES # BLD: 0.02 10*3/MM3 (ref 0–0.03)
IMM GRANULOCYTES NFR BLD: 0.2 % (ref 0–0.5)
INR PPP: 2.2 (ref 0.9–1.1)
LYMPHOCYTES # BLD AUTO: 8.37 10*3/MM3 (ref 1–3.5)
LYMPHOCYTES NFR BLD AUTO: 71.7 % (ref 20–49)
MCH RBC QN AUTO: 30.2 PG (ref 27–33)
MCHC RBC AUTO-ENTMCNC: 32.2 G/DL (ref 32–35)
MCV RBC AUTO: 93.7 FL (ref 83–97)
MONOCYTES # BLD AUTO: 0.63 10*3/MM3 (ref 0.25–0.8)
MONOCYTES NFR BLD AUTO: 5.4 % (ref 4–12)
NEUTROPHILS # BLD AUTO: 2.54 10*3/MM3 (ref 1.5–7)
NEUTROPHILS NFR BLD AUTO: 21.7 % (ref 39–75)
NRBC BLD MANUAL-RTO: 0 /100 WBC (ref 0–0)
PLATELET # BLD AUTO: 78 10*3/MM3 (ref 150–375)
PMV BLD AUTO: 11.9 FL (ref 8.9–12.1)
PROTHROMBIN TIME: 25.8 SECONDS (ref 11–13.5)
RBC # BLD AUTO: 4.41 10*6/MM3 (ref 3.9–5)
WBC NRBC COR # BLD: 11.67 10*3/MM3 (ref 4–10)

## 2018-10-05 PROCEDURE — 36416 COLLJ CAPILLARY BLOOD SPEC: CPT | Performed by: INTERNAL MEDICINE

## 2018-10-05 PROCEDURE — 85025 COMPLETE CBC W/AUTO DIFF WBC: CPT | Performed by: INTERNAL MEDICINE

## 2018-10-05 PROCEDURE — 85610 PROTHROMBIN TIME: CPT | Performed by: INTERNAL MEDICINE

## 2018-10-05 NOTE — PROGRESS NOTES
INR 2.2. Per Ss pt is to take 3 mg of Coumadin on Sun,Tue,Thur and 2 mg all other days. CBC reviewed and is satisfactory for this pt at this time. Pt had no C/O. Copy of labs and future appt given. Pt V/U.   Lab Results   Component Value Date    WBC 11.67 (H) 10/05/2018    HGB 13.3 10/05/2018    HCT 41.3 10/05/2018    MCV 93.7 10/05/2018    PLT 78 (L) 10/05/2018

## 2018-10-30 DIAGNOSIS — R73.03 PREDIABETES: ICD-10-CM

## 2018-10-30 DIAGNOSIS — I10 BENIGN ESSENTIAL HTN: ICD-10-CM

## 2018-10-30 DIAGNOSIS — Z00.00 HEALTH CARE MAINTENANCE: Primary | ICD-10-CM

## 2018-10-30 DIAGNOSIS — N18.30 CHRONIC KIDNEY DISEASE (CKD), STAGE III (MODERATE) (HCC): ICD-10-CM

## 2018-10-31 LAB
ALBUMIN SERPL-MCNC: 4.2 G/DL (ref 3.5–5.2)
ALBUMIN/GLOB SERPL: 1.7 G/DL
ALP SERPL-CCNC: 56 U/L (ref 39–117)
ALT SERPL-CCNC: 9 U/L (ref 1–33)
AST SERPL-CCNC: 12 U/L (ref 1–32)
BASOPHILS # BLD MANUAL: 0 10*3/MM3 (ref 0–0.2)
BASOPHILS NFR BLD MANUAL: 0 % (ref 0–1.5)
BILIRUB SERPL-MCNC: 0.4 MG/DL (ref 0.1–1.2)
BUN SERPL-MCNC: 22 MG/DL (ref 8–23)
BUN/CREAT SERPL: 21.6 (ref 7–25)
CALCIUM SERPL-MCNC: 9.8 MG/DL (ref 8.2–9.6)
CHLORIDE SERPL-SCNC: 107 MMOL/L (ref 98–107)
CHOLEST SERPL-MCNC: 186 MG/DL (ref 0–200)
CO2 SERPL-SCNC: 27.3 MMOL/L (ref 22–29)
CREAT SERPL-MCNC: 1.02 MG/DL (ref 0.57–1)
DIFFERENTIAL COMMENT: ABNORMAL
EOSINOPHIL # BLD MANUAL: 0 10*3/MM3 (ref 0–0.7)
EOSINOPHIL NFR BLD MANUAL: 0 % (ref 0.3–6.2)
ERYTHROCYTE [DISTWIDTH] IN BLOOD BY AUTOMATED COUNT: 14.9 % (ref 11.7–13)
GLOBULIN SER CALC-MCNC: 2.5 GM/DL
GLUCOSE SERPL-MCNC: 81 MG/DL (ref 65–99)
HBA1C MFR BLD: 5.2 % (ref 4.8–5.6)
HCT VFR BLD AUTO: 42.6 % (ref 35.6–45.5)
HDLC SERPL-MCNC: 46 MG/DL (ref 40–60)
HGB BLD-MCNC: 13.7 G/DL (ref 11.9–15.5)
LDLC SERPL CALC-MCNC: 120 MG/DL (ref 0–100)
LYMPHOCYTES # BLD MANUAL: 9.13 10*3/MM3 (ref 0.9–4.8)
LYMPHOCYTES NFR BLD MANUAL: 78 % (ref 19.6–45.3)
MCH RBC QN AUTO: 30.6 PG (ref 26.9–32)
MCHC RBC AUTO-ENTMCNC: 32.2 G/DL (ref 32.4–36.3)
MCV RBC AUTO: 95.3 FL (ref 80.5–98.2)
MONOCYTES # BLD MANUAL: 0.7 10*3/MM3 (ref 0.2–1.2)
MONOCYTES NFR BLD MANUAL: 6 % (ref 5–12)
NEUTROPHILS # BLD MANUAL: 1.17 10*3/MM3 (ref 1.9–8.1)
NEUTROPHILS NFR BLD MANUAL: 10 % (ref 42.7–76)
PLATELET # BLD AUTO: 95 10*3/MM3 (ref 140–500)
PLATELET BLD QL SMEAR: ABNORMAL
POTASSIUM SERPL-SCNC: 4.3 MMOL/L (ref 3.5–5.2)
PROT SERPL-MCNC: 6.7 G/DL (ref 6–8.5)
RBC # BLD AUTO: 4.47 10*6/MM3 (ref 3.9–5.2)
RBC MORPH BLD: ABNORMAL
SODIUM SERPL-SCNC: 143 MMOL/L (ref 136–145)
TRIGL SERPL-MCNC: 98 MG/DL (ref 0–150)
TSH SERPL DL<=0.005 MIU/L-ACNC: 2.86 MIU/ML (ref 0.27–4.2)
UNABLE TO VOID: NORMAL
VLDLC SERPL CALC-MCNC: 19.6 MG/DL (ref 5–40)
WBC # BLD AUTO: 11.7 10*3/MM3 (ref 4.5–10.7)

## 2018-11-05 ENCOUNTER — TRANSCRIBE ORDERS (OUTPATIENT)
Dept: ADMINISTRATIVE | Facility: HOSPITAL | Age: 83
End: 2018-11-05

## 2018-11-05 DIAGNOSIS — Z12.39 BREAST SCREENING: Primary | ICD-10-CM

## 2018-11-07 ENCOUNTER — OFFICE VISIT (OUTPATIENT)
Dept: INTERNAL MEDICINE | Facility: CLINIC | Age: 83
End: 2018-11-07

## 2018-11-07 VITALS
DIASTOLIC BLOOD PRESSURE: 72 MMHG | BODY MASS INDEX: 32.43 KG/M2 | SYSTOLIC BLOOD PRESSURE: 122 MMHG | HEIGHT: 63 IN | WEIGHT: 183 LBS | HEART RATE: 60 BPM | RESPIRATION RATE: 18 BRPM | OXYGEN SATURATION: 98 %

## 2018-11-07 DIAGNOSIS — Z23 NEED FOR INFLUENZA VACCINATION: ICD-10-CM

## 2018-11-07 DIAGNOSIS — I10 BENIGN ESSENTIAL HTN: ICD-10-CM

## 2018-11-07 DIAGNOSIS — N18.30 CHRONIC KIDNEY DISEASE (CKD), STAGE III (MODERATE) (HCC): ICD-10-CM

## 2018-11-07 DIAGNOSIS — D68.59 THROMBOPHILIA (HCC): ICD-10-CM

## 2018-11-07 DIAGNOSIS — Z00.00 MEDICARE ANNUAL WELLNESS VISIT, SUBSEQUENT: Primary | ICD-10-CM

## 2018-11-07 DIAGNOSIS — R60.0 BILATERAL LOWER EXTREMITY EDEMA: ICD-10-CM

## 2018-11-07 DIAGNOSIS — R73.03 PREDIABETES: ICD-10-CM

## 2018-11-07 PROCEDURE — G0008 ADMIN INFLUENZA VIRUS VAC: HCPCS | Performed by: INTERNAL MEDICINE

## 2018-11-07 PROCEDURE — G0439 PPPS, SUBSEQ VISIT: HCPCS | Performed by: INTERNAL MEDICINE

## 2018-11-07 PROCEDURE — 90662 IIV NO PRSV INCREASED AG IM: CPT | Performed by: INTERNAL MEDICINE

## 2018-11-07 NOTE — PATIENT INSTRUCTIONS
Medicare Wellness  Personal Prevention Plan of Service     Date of Office Visit:  2018  Encounter Provider:  Lena Yip MD  Place of Service:  [facility]  Patient Name: Virginia Benavidez  :  1925    As part of the Medicare Wellness portion of your visit today, we are providing you with this personalized preventive plan of services (PPPS). This plan is based upon recommendations of the [place] Preventive Services Task Force (USPSTF) and the Advisory Committee on Immunization Practices (ACIP).    This lists the preventive care services that should be considered, and provides dates of when you are due. Items listed as completed are up-to-date and do not require any further intervention.    Health Maintenance   Topic Date Due   • ZOSTER VACCINE (2 of 2) 10/05/2016   • INFLUENZA VACCINE  2018   • MEDICARE ANNUAL WELLNESS  10/11/2018   • PNEUMOCOCCAL VACCINES (65+ LOW/MEDIUM RISK) (2 of 2 - PPSV23) 2018 (Originally 8/10/2017)   • MAMMOGRAM  2018   • LIPID PANEL  10/31/2019   • TDAP/TD VACCINES (2 - Td) 08/10/2026       No orders of the defined types were placed in this encounter.      No Follow-up on file.

## 2018-11-07 NOTE — PROGRESS NOTES
Medicare Annual Wellness Visit    Chief Complaint:  Annual Exam (SUB AWV)      History of Present Illness    Virginia Benavidez is a 89 y.o. female who presents for an Annual Wellness Visit. In addition, we addressed the following health issues:    Mammo: 12/8/2017  Flu shot: needs  Prevnar: Declined  Pneumovax: Declined  Dental Exam: Not utd, doesn't go anymore.   Eye Exam: Twice yearly, utd.   Exercise: Limited, must use a walker.     Advanced Care Planning:  has an advance directive - a copy HAS NOT been provided   Immunization History   Administered Date(s) Administered   • Flu Vaccine High Dose PF 65YR+ 10/11/2017, 11/07/2018   • Influenza, Unspecified 09/01/2012, 01/12/2015     Virginia is here for AWV and follow up.  No new concerns.  She is off all bp and lipid meds.  She does get tired easily but attributes it to age.  She uses voltaren gel as needed for her pains.  She has edema in her legs but it's too difficulty to get compression socks on.  No bleeding on her coumadin.  Her A1c has decreased.      Review of Systems   Constitution: Negative for chills, fever and malaise/fatigue.   HENT: Positive for hearing loss. Negative for hoarse voice and sore throat.    Eyes: Negative for blurred vision, double vision and visual disturbance.   Cardiovascular: Positive for leg swelling. Negative for chest pain and palpitations.   Respiratory: Negative for cough and shortness of breath.    Endocrine: Negative for polydipsia and polyuria.   Hematologic/Lymphatic: Does not bruise/bleed easily.   Skin: Negative for rash and suspicious lesions.   Musculoskeletal: Positive for arthritis and joint pain. Negative for myalgias.   Gastrointestinal: Negative for bloating, abdominal pain, change in bowel habit, constipation, diarrhea, dysphagia, hematochezia, melena, nausea and vomiting.   Genitourinary: Negative for dysuria and hematuria.   Neurological: Negative for dizziness, headaches and light-headedness.    Psychiatric/Behavioral: Negative for depression. The patient is not nervous/anxious.        Past Medical History:   Diagnosis Date   • Antiphospholipid syndrome (CMS/HCC)    • Atresia (acquired) of cervix     atresia   • Breast cancer (CMS/HCC)     2005 with lumpectomy   • Chronic kidney disease     Stage III   • Chronic lymphocytic leukemia (CMS/HCC)    • Cystocele    • Deep vein thrombosis of lower extremity (CMS/HCC)    • Glaucoma    • Gout of big toe 8/10/2016   • History of radiation therapy    • Hyperlipidemia    • Hypertension    • Osteoarthritis    • Thrombocytopenia (CMS/HCC)        Past Surgical History:   Procedure Laterality Date   • BACK SURGERY     • BREAST BIOPSY  1992   • BREAST LUMPECTOMY     • CHOLECYSTECTOMY         Social History     Social History   • Marital status: Single     Spouse name: N/A   • Number of children: 3   • Years of education: N/A     Occupational History   •  Retired     Social History Main Topics   • Smoking status: Never Smoker   • Smokeless tobacco: Never Used   • Alcohol use Yes      Comment: Occasionally   • Drug use: Unknown   • Sexual activity: Not on file     Other Topics Concern   • Not on file     Social History Narrative   • No narrative on file       Family History   Problem Relation Age of Onset   • Alzheimer's disease Mother    • Emphysema Father    • Lung cancer Sister        Allergies   Allergen Reactions   • Morphine And Related    • Penicillins        Current Outpatient Prescriptions on File Prior to Visit   Medication Sig Dispense Refill   • diclofenac (VOLTAREN) 1 % gel gel Place on the skin. Apply 4 grams to lower extremeties bid as needed     • dorzolamide (TRUSOPT) 2 % ophthalmic solution 1 drop 3 (three) times a day.     • meloxicam (MOBIC) 15 MG tablet TAKE ONE TABLET BY MOUTH DAILY WITH FOOD 30 tablet 2   • timolol (TIMOPTIC) 0.25 % ophthalmic solution 1 drop 2 (two) times a day.     • warfarin (COUMADIN) 2 MG tablet Take 3mg on Sunday Tuesday and  "Thursday and 2mg the remaining days unless directed by your doctor 90 tablet 2     No current facility-administered medications on file prior to visit.        Patient Active Problem List   Diagnosis   • Chronic kidney disease (CKD), stage III (moderate) (CMS/HCC)   • Benign essential HTN   • Gait instability   • Prediabetes   • Degenerative joint disease involving multiple joints   • Disorder of peripheral nervous system   • Primary osteoarthritis involving multiple joints   • Long term current use of anticoagulant   • Bilateral lower extremity edema   • Thrombophilia (CMS/[facility])       Health Risk Assessment/Depression Screen/Functional and Cognitive Screening:   The patient has completed a Health Risk Assessment & Depression screen. These have been reviewed with them and have been scanned as a separate documents.    Age-appropriate Screening Schedule:  Refer to the list below for future screening recommendations based on patient's age. Orders for these recommended tests are listed in the plan section. The patient has been provided with a written plan.     I have reviewed their problem list, past medical history, family history, social history, and surgical history.     Vitals:    11/07/18 0827   BP: 122/72   Pulse: 60   Resp: 18   SpO2: 98%   Weight: 83 kg (183 lb)   Height: 159 cm (62.6\")   PainSc: 0-No pain       Body mass index is 32.83 kg/m².    Physical Exam   Constitutional: She is oriented to person, place, and time. She appears well-developed and well-nourished. No distress.   HENT:   Head: Normocephalic and atraumatic.   Nose: Nose normal.   Mouth/Throat: Oropharynx is clear and moist.   Eyes: Pupils are equal, round, and reactive to light. Conjunctivae and EOM are normal. No scleral icterus.   Neck: Normal range of motion. Neck supple. No thyromegaly present.   Cardiovascular: Normal rate, regular rhythm and normal heart sounds.  Exam reveals no gallop and no friction rub.    No murmur heard.  Pulses:       " Carotid pulses are 2+ on the right side, and 2+ on the left side.       Femoral pulses are 2+ on the right side, and 2+ on the left side.       Dorsalis pedis pulses are 2+ on the right side, and 2+ on the left side.        Posterior tibial pulses are 2+ on the right side, and 2+ on the left side.   Pulmonary/Chest: Effort normal and breath sounds normal. No respiratory distress. She has no wheezes. She has no rales. Right breast exhibits no mass and no nipple discharge. Left breast exhibits no mass and no nipple discharge.   Abdominal: Soft. Bowel sounds are normal. She exhibits no distension and no mass. There is no tenderness.   Musculoskeletal: Normal range of motion. She exhibits no edema.     Vascular Status -  Her right foot exhibits no edema. Her left foot exhibits no edema.  Skin Integrity  -  Her right foot skin is intact.Her left foot skin is intact..  Lymphadenopathy:     She has no cervical adenopathy.     She has no axillary adenopathy.        Right: No inguinal and no supraclavicular adenopathy present.        Left: No inguinal and no supraclavicular adenopathy present.   Neurological: She is alert and oriented to person, place, and time. She has normal reflexes. No cranial nerve deficit.   Skin: Skin is warm and dry.   Psychiatric: She has a normal mood and affect. Her speech is normal and behavior is normal. Judgment and thought content normal. Cognition and memory are normal.   Vitals reviewed.      Results for orders placed or performed in visit on 10/30/18   Comprehensive Metabolic Panel   Result Value Ref Range    Glucose 81 65 - 99 mg/dL    BUN 22 8 - 23 mg/dL    Creatinine 1.02 (H) 0.57 - 1.00 mg/dL    eGFR Non African Am 51 (L) >60 mL/min/1.73    eGFR African Am 61 >60 mL/min/1.73    BUN/Creatinine Ratio 21.6 7.0 - 25.0    Sodium 143 136 - 145 mmol/L    Potassium 4.3 3.5 - 5.2 mmol/L    Chloride 107 98 - 107 mmol/L    Total CO2 27.3 22.0 - 29.0 mmol/L    Calcium 9.8 (H) 8.2 - 9.6 mg/dL     Total Protein 6.7 6.0 - 8.5 g/dL    Albumin 4.20 3.50 - 5.20 g/dL    Globulin 2.5 gm/dL    A/G Ratio 1.7 g/dL    Total Bilirubin 0.4 0.1 - 1.2 mg/dL    Alkaline Phosphatase 56 39 - 117 U/L    AST (SGOT) 12 1 - 32 U/L    ALT (SGPT) 9 1 - 33 U/L   Lipid Panel   Result Value Ref Range    Total Cholesterol 186 0 - 200 mg/dL    Triglycerides 98 0 - 150 mg/dL    HDL Cholesterol 46 40 - 60 mg/dL    VLDL Cholesterol 19.6 5 - 40 mg/dL    LDL Cholesterol  120 (H) 0 - 100 mg/dL   TSH Rfx On Abnormal To Free T4   Result Value Ref Range    TSH 2.86 0.27 - 4.2 mIU/mL   Hemoglobin A1c   Result Value Ref Range    Hemoglobin A1C 5.20 4.80 - 5.60 %   Manual Differential   Result Value Ref Range    Neutrophil Rel % 10.0 (L) 42.7 - 76.0 %    Lymphocyte Rel % 78.0 (H) 19.6 - 45.3 %    Monocyte Rel % 6.0 5.0 - 12.0 %    Eosinophil Rel % 0.0 (L) 0.3 - 6.2 %    Basophil Rel % 0.0 0.0 - 1.5 %    Neutrophils Absolute 1.17 (L) 1.90 - 8.10 10*3/mm3    Lymphocytes Absolute 9.13 (H) 0.90 - 4.80 10*3/mm3    Monocytes Absolute 0.70 0.20 - 1.20 10*3/mm3    Eosinophil Abs 0.00 0.00 - 0.70 10*3/mm3    Basophils Absolute 0.00 0.00 - 0.20 10*3/mm3    Differential Comment Comment     Comment Comment     Plt Comment Comment    Unable To Void   Result Value Ref Range    Unable to Void Comment    CBC & Differential   Result Value Ref Range    WBC 11.70 (H) 4.50 - 10.70 10*3/mm3    RBC 4.47 3.90 - 5.20 10*6/mm3    Hemoglobin 13.7 11.9 - 15.5 g/dL    Hematocrit 42.6 35.6 - 45.5 %    MCV 95.3 80.5 - 98.2 fL    MCH 30.6 26.9 - 32.0 pg    MCHC 32.2 (L) 32.4 - 36.3 g/dL    RDW 14.9 (H) 11.7 - 13.0 %    Platelets 95 (L) 140 - 500 10*3/mm3       Assessment & Plan:    Diagnoses and all orders for this visit:    Medicare annual wellness visit, subsequent    Benign essential HTN    Chronic kidney disease (CKD), stage III (moderate) (CMS/[facility])    Prediabetes    Need for influenza vaccination  -     Fluzone High Dose =>65Years    Thrombophilia (CMS/[facility])    Bilateral  lower extremity edema        Discussion/Summary  Virginia is here for her AWV and follow up.  She is up to date on most health maintenance.  She declines pneumonia shots.  She got her flu shot today..  Her bp is actually ok at rest.  Her labs all are stable.  Kidney function is stable.  A1c is better.  Continue to stay as active as she can at 89.  She will follow up with oncology in 6 mo.  I will see her back in a year unless she needs to see me sooner.        Follow Up:  Return in about 1 year (around 11/7/2019) for Annual physical, with fasting labs prior.     An After Visit Summary and PPPS with all of these plans were given to the patient.

## 2018-11-08 ENCOUNTER — TELEPHONE (OUTPATIENT)
Dept: INTERNAL MEDICINE | Facility: CLINIC | Age: 83
End: 2018-11-08

## 2018-11-08 LAB
APPEARANCE UR: CLEAR
BACTERIA #/AREA URNS HPF: ABNORMAL /HPF
BILIRUB UR QL STRIP: NEGATIVE
CASTS URNS MICRO: ABNORMAL
CASTS URNS QL MICRO: PRESENT /LPF
COLOR UR: YELLOW
EPI CELLS #/AREA URNS HPF: ABNORMAL /HPF
GLUCOSE UR QL: NEGATIVE
HGB UR QL STRIP: NEGATIVE
KETONES UR QL STRIP: NEGATIVE
LEUKOCYTE ESTERASE UR QL STRIP: NEGATIVE
MICRO URNS: NORMAL
MICRO URNS: NORMAL
MUCOUS THREADS URNS QL MICRO: PRESENT /HPF
NITRITE UR QL STRIP: NEGATIVE
PH UR STRIP: 5 [PH] (ref 5–7.5)
PROT UR QL STRIP: NEGATIVE
RBC #/AREA URNS HPF: ABNORMAL /HPF
SP GR UR: 1.02 (ref 1–1.03)
URINALYSIS REFLEX: NORMAL
UROBILINOGEN UR STRIP-MCNC: 0.2 MG/DL (ref 0.2–1)
WBC #/AREA URNS HPF: ABNORMAL /HPF

## 2018-11-08 NOTE — TELEPHONE ENCOUNTER
----- Message from Lena Yip MD sent at 11/8/2018  9:43 AM EST -----  Please call - her urine is negative.

## 2018-11-29 ENCOUNTER — CLINICAL SUPPORT (OUTPATIENT)
Dept: ONCOLOGY | Facility: HOSPITAL | Age: 83
End: 2018-11-29

## 2018-11-29 ENCOUNTER — LAB (OUTPATIENT)
Dept: LAB | Facility: HOSPITAL | Age: 83
End: 2018-11-29

## 2018-11-29 DIAGNOSIS — Z79.01 LONG TERM CURRENT USE OF ANTICOAGULANT: Primary | ICD-10-CM

## 2018-11-29 LAB
INR PPP: 2.3 (ref 0.9–1.1)
PROTHROMBIN TIME: 27.1 SECONDS (ref 11–13.5)

## 2018-11-29 PROCEDURE — 85610 PROTHROMBIN TIME: CPT

## 2018-12-10 ENCOUNTER — APPOINTMENT (OUTPATIENT)
Dept: MAMMOGRAPHY | Facility: HOSPITAL | Age: 83
End: 2018-12-10

## 2019-01-04 RX ORDER — MELOXICAM 15 MG/1
TABLET ORAL
Qty: 30 TABLET | Refills: 1 | Status: SHIPPED | OUTPATIENT
Start: 2019-01-04 | End: 2019-03-02 | Stop reason: SDUPTHER

## 2019-01-17 ENCOUNTER — HOSPITAL ENCOUNTER (OUTPATIENT)
Dept: MAMMOGRAPHY | Facility: HOSPITAL | Age: 84
Discharge: HOME | End: 2019-01-17
Admitting: INTERNAL MEDICINE

## 2019-01-17 DIAGNOSIS — Z12.39 BREAST SCREENING: ICD-10-CM

## 2019-01-17 PROCEDURE — 77063 BREAST TOMOSYNTHESIS BI: CPT

## 2019-01-17 PROCEDURE — 77067 SCR MAMMO BI INCL CAD: CPT

## 2019-01-18 ENCOUNTER — TELEPHONE (OUTPATIENT)
Dept: INTERNAL MEDICINE | Facility: CLINIC | Age: 84
End: 2019-01-18

## 2019-01-25 ENCOUNTER — LAB (OUTPATIENT)
Dept: LAB | Facility: HOSPITAL | Age: 84
End: 2019-01-25

## 2019-01-25 ENCOUNTER — CLINICAL SUPPORT (OUTPATIENT)
Dept: ONCOLOGY | Facility: HOSPITAL | Age: 84
End: 2019-01-25

## 2019-01-25 DIAGNOSIS — Z79.01 LONG TERM CURRENT USE OF ANTICOAGULANT: Primary | ICD-10-CM

## 2019-01-25 LAB
INR PPP: 1.8 (ref 0.9–1.1)
PROTHROMBIN TIME: 21.7 SECONDS (ref 11–13.5)

## 2019-01-25 PROCEDURE — 85610 PROTHROMBIN TIME: CPT

## 2019-01-25 NOTE — PROGRESS NOTES
INR 1.8. She eat green veggies sometimes. Per SS pt is to take 2 mg of Coumadin on M,W,F and 3 mg all other days. She will return in 2 weeks for a PT/INT. Message sent to scheduling.

## 2019-02-08 ENCOUNTER — LAB (OUTPATIENT)
Dept: LAB | Facility: HOSPITAL | Age: 84
End: 2019-02-08

## 2019-02-08 ENCOUNTER — CLINICAL SUPPORT (OUTPATIENT)
Dept: ONCOLOGY | Facility: HOSPITAL | Age: 84
End: 2019-02-08

## 2019-02-08 DIAGNOSIS — Z79.01 LONG TERM CURRENT USE OF ANTICOAGULANT: Primary | ICD-10-CM

## 2019-02-08 LAB
INR PPP: 2.4 (ref 0.9–1.1)
PROTHROMBIN TIME: 28.7 SECONDS (ref 11–13.5)

## 2019-02-08 PROCEDURE — 85610 PROTHROMBIN TIME: CPT

## 2019-03-04 RX ORDER — MELOXICAM 15 MG/1
TABLET ORAL
Qty: 30 TABLET | Refills: 3 | Status: SHIPPED | OUTPATIENT
Start: 2019-03-04 | End: 2020-01-03

## 2019-03-07 ENCOUNTER — LAB (OUTPATIENT)
Dept: LAB | Facility: HOSPITAL | Age: 84
End: 2019-03-07

## 2019-03-07 ENCOUNTER — CLINICAL SUPPORT (OUTPATIENT)
Dept: ONCOLOGY | Facility: HOSPITAL | Age: 84
End: 2019-03-07

## 2019-03-07 DIAGNOSIS — Z79.01 LONG TERM CURRENT USE OF ANTICOAGULANT: Primary | ICD-10-CM

## 2019-03-07 LAB
INR PPP: 1.8 (ref 0.9–1.1)
PROTHROMBIN TIME: 22.2 SECONDS (ref 11–13.5)

## 2019-03-07 PROCEDURE — 85610 PROTHROMBIN TIME: CPT

## 2019-04-08 RX ORDER — WARFARIN SODIUM 2 MG/1
TABLET ORAL
Qty: 90 TABLET | Refills: 2 | Status: SHIPPED | OUTPATIENT
Start: 2019-04-08 | End: 2019-11-22 | Stop reason: SDUPTHER

## 2019-04-23 ENCOUNTER — LAB (OUTPATIENT)
Dept: LAB | Facility: HOSPITAL | Age: 84
End: 2019-04-23

## 2019-04-23 ENCOUNTER — OFFICE VISIT (OUTPATIENT)
Dept: ONCOLOGY | Facility: CLINIC | Age: 84
End: 2019-04-23

## 2019-04-23 VITALS
OXYGEN SATURATION: 97 % | BODY MASS INDEX: 33.84 KG/M2 | DIASTOLIC BLOOD PRESSURE: 84 MMHG | RESPIRATION RATE: 18 BRPM | HEART RATE: 57 BPM | SYSTOLIC BLOOD PRESSURE: 178 MMHG | TEMPERATURE: 98.1 F | WEIGHT: 191 LBS | HEIGHT: 63 IN

## 2019-04-23 DIAGNOSIS — C91.10 CLL (CHRONIC LYMPHOCYTIC LEUKEMIA) (HCC): Primary | ICD-10-CM

## 2019-04-23 DIAGNOSIS — D68.59 THROMBOPHILIA (HCC): ICD-10-CM

## 2019-04-23 DIAGNOSIS — Z79.01 LONG TERM CURRENT USE OF ANTICOAGULANT: Primary | ICD-10-CM

## 2019-04-23 LAB
BASOPHILS # BLD AUTO: 0.02 10*3/MM3 (ref 0–0.2)
BASOPHILS NFR BLD AUTO: 0.2 % (ref 0–1.5)
DEPRECATED RDW RBC AUTO: 47.2 FL (ref 37–54)
EOSINOPHIL # BLD AUTO: 0.14 10*3/MM3 (ref 0–0.4)
EOSINOPHIL NFR BLD AUTO: 1.4 % (ref 0.3–6.2)
ERYTHROCYTE [DISTWIDTH] IN BLOOD BY AUTOMATED COUNT: 14 % (ref 12.3–15.4)
HCT VFR BLD AUTO: 42 % (ref 34–46.6)
HGB BLD-MCNC: 13.3 G/DL (ref 12–15.9)
IMM GRANULOCYTES # BLD AUTO: 0.03 10*3/MM3 (ref 0–0.05)
IMM GRANULOCYTES NFR BLD AUTO: 0.3 % (ref 0–0.5)
INR PPP: 2.7 (ref 0.9–1.1)
LYMPHOCYTES # BLD AUTO: 6.82 10*3/MM3 (ref 0.7–3.1)
LYMPHOCYTES NFR BLD AUTO: 67.9 % (ref 19.6–45.3)
MCH RBC QN AUTO: 29.6 PG (ref 26.6–33)
MCHC RBC AUTO-ENTMCNC: 31.7 G/DL (ref 31.5–35.7)
MCV RBC AUTO: 93.3 FL (ref 79–97)
MONOCYTES # BLD AUTO: 0.7 10*3/MM3 (ref 0.1–0.9)
MONOCYTES NFR BLD AUTO: 7 % (ref 5–12)
NEUTROPHILS # BLD AUTO: 2.33 10*3/MM3 (ref 1.7–7)
NEUTROPHILS NFR BLD AUTO: 23.2 % (ref 42.7–76)
NRBC BLD AUTO-RTO: 0 /100 WBC (ref 0–0.2)
PLATELET # BLD AUTO: 83 10*3/MM3 (ref 140–450)
PMV BLD AUTO: 10.3 FL (ref 6–12)
PROTHROMBIN TIME: 31.9 SECONDS (ref 11–13.5)
RBC # BLD AUTO: 4.5 10*6/MM3 (ref 3.77–5.28)
WBC NRBC COR # BLD: 10.04 10*3/MM3 (ref 3.4–10.8)

## 2019-04-23 PROCEDURE — 85610 PROTHROMBIN TIME: CPT

## 2019-04-23 PROCEDURE — 36415 COLL VENOUS BLD VENIPUNCTURE: CPT | Performed by: NURSE PRACTITIONER

## 2019-04-23 PROCEDURE — 85025 COMPLETE CBC W/AUTO DIFF WBC: CPT | Performed by: NURSE PRACTITIONER

## 2019-04-23 PROCEDURE — 99213 OFFICE O/P EST LOW 20 MIN: CPT | Performed by: INTERNAL MEDICINE

## 2019-04-23 NOTE — PROGRESS NOTES
Subjective .     REASONS FOR FOLLOWUP:    1. Abnormal mammogram 6/09.    2. Remote history of breast cancer, status post lumpectomy and radiation therapy (right).    3. History of CLL, stage 0.    4. Thrombophilia.      History of Present Illness     Mrs Benavidez returns today feeling well, overall.  Her INR today is within the therapeutic range at 2.7.  She is currently on 19 mg of Coumadin daily.  She denies any excess bleeding or bruising.  No chest pain, shortness of breath, or worsening lower extremity edema.  Patient does have some age-related skin changes as well as some areas of psoriatic flare.  This has been a chronic issue.    In regards to her CLL, she denies any unintentional weight loss, drenching night sweats, or new adenopathies or nodules.  Her total white blood cell count today is normal at 10.0.  Her hemoglobin is also within normal limits at 13.3.  She is slightly thrombocytopenic with a platelet count of 83,000, again without any bleeding issues.  She does take Mobic, 3 times daily.  This is certainly contributing to her low platelet count.    She has no other issues today.    Past Medical History:   Diagnosis Date   • Antiphospholipid syndrome (CMS/HCC)    • Atresia (acquired) of cervix     atresia   • Breast cancer (CMS/HCC)     2005 with lumpectomy   • Chronic kidney disease     Stage III   • Chronic lymphocytic leukemia (CMS/HCC)    • Cystocele    • Deep vein thrombosis of lower extremity (CMS/HCC)    • Glaucoma    • Gout of big toe 8/10/2016   • History of radiation therapy    • Hyperlipidemia    • Hypertension    • Osteoarthritis    • Thrombocytopenia (CMS/HCC)        ONCOLOGIC HISTORY:  (History from previous dates can be found in the separate document.)    Current Outpatient Medications on File Prior to Visit   Medication Sig Dispense Refill   • diclofenac (VOLTAREN) 1 % gel gel Apply 4 grams to lower extremeties bid as needed 100 g 1   • dorzolamide (TRUSOPT) 2 % ophthalmic solution 1  drop 3 (three) times a day.     • meloxicam (MOBIC) 15 MG tablet TAKE ONE TABLET BY MOUTH DAILY WITH FOOD 30 tablet 3   • timolol (TIMOPTIC) 0.25 % ophthalmic solution 1 drop 2 (two) times a day.     • warfarin (COUMADIN) 2 MG tablet Take 2 mg on Monday and Friday and 3 mg on the remaining days unless directed by physician 90 tablet 2     No current facility-administered medications on file prior to visit.        ALLERGIES:     Allergies   Allergen Reactions   • Morphine And Related    • Penicillins        Social History     Socioeconomic History   • Marital status: Single     Spouse name: Not on file   • Number of children: 3   • Years of education: Not on file   • Highest education level: Not on file   Occupational History     Employer: RETIRED   Tobacco Use   • Smoking status: Never Smoker   • Smokeless tobacco: Never Used   Substance and Sexual Activity   • Alcohol use: Yes     Comment: Occasionally         Cancer-related family history includes Lung cancer in her sister.     Review of Systems  A comprehensive 14 point review of systems was performed and was negative except as mentioned.    Objective      There were no vitals filed for this visit.  Current Status 4/19/2018   ECOG score 1       Physical Exam   Constitutional: She appears well-developed and well-nourished.   HENT:   Head: Normocephalic.   Neck: Neck supple.   Cardiovascular: Normal rate.   Pulmonary/Chest: Effort normal and breath sounds normal.   Abdominal: Soft. She exhibits no mass.   Musculoskeletal: She exhibits no edema.   Lymphadenopathy:     She has no cervical adenopathy.   Skin: Skin is warm and dry.   Psychiatric: She has a normal mood and affect. Judgment and thought content normal.   Vitals reviewed.   Trandsported via wheelchair, with daughter  Areas of skin discoloration with age-related changes     RECENT LABS:      4/19/2018   WBC 4.00 - 10.00 10*3/mm3 12.05 (A)   Neutrophils, Absolute 1.50 - 7.00 10*3/mm3 2.78   Hemoglobin 11.5 -  14.9 g/dL 13.7   Hematocrit 34.0 - 45.0 % 43.0   Platelets 150 - 375 10*3/mm3 89 (A)   INR 0.90 - 1.10 2.90 (A)   Protime 11.0 - 13.5 Seconds 34.8 (A)     Assessment/Plan    CLL: No clinical evidence suggestive of recurrence. Stable labs.  She does exhibit slight thrombocytopenia with a platelet count of 83,000.  This is likely a result of Mobic use.  I have asked her to discontinue this in light of her chronic anticoagulation with Coumadin.  She will transition to extra strength Tylenol.  We will plan CBC and CMP in 6  Months with MD visit in one year.     History of breast ca(2005):no screening studies. WAN    Thrombophilia:lifelong warfarin. Very well-controlled.  INR is therapeutic at 2.7 today.  We will increase her dose of Coumadin just slightly down to 18 mg weekly.  As above, patient demonstrates a low platelet count today and I have asked her to discontinue Mobic and all other anti-inflammatory NSAIDs.  She will transition to Tylenol.  We will maintain an every 8-week INR check.    -Have asked the patient to call with any issues or concerns prior to her next office visit.  She has verbalized understanding.    I have reviewed the notes, assessments, and/or procedures performed by ISIDRO Perez.  I concur with her/his documentation of Virginia Benavidez.

## 2019-05-08 NOTE — PROGRESS NOTES
Chief Complaint  Virginia Benavidez is a 89 y.o. female who presents for Hypertension; Follow-up (6 month); and Chronic Kidney Disease  .    History of Present Illness   HTN - No cp or palp or dizziness or soa.  She does have a lot of edema in her legs which she feels is a little worse than it had been.      CKD - Her creatinine is better.      PreDM - her A1c is better, despite her candy consumption over the holidays.          Review of Systems   Constitutional: Negative for fatigue and unexpected weight change.   Respiratory: Positive for shortness of breath (if she exerts herself). Negative for cough and chest tightness.    Cardiovascular: Positive for leg swelling. Negative for chest pain and palpitations.   Neurological: Negative for dizziness and light-headedness.       Patient Active Problem List   Diagnosis   • Chronic kidney disease (CKD), stage III (moderate)   • Benign essential HTN   • Gait instability   • HLD (hyperlipidemia)   • Prediabetes   • Degenerative joint disease involving multiple joints   • Disorder of peripheral nervous system   • Primary osteoarthritis involving multiple joints   • Long term current use of anticoagulant   • Gout of big toe   • Bilateral lower extremity edema       Past Medical History   Diagnosis Date   • Antiphospholipid syndrome    • Atresia (acquired) of cervix      atresia   • Breast cancer      2005 with lumpectomy   • Chronic kidney disease      Stage III   • Chronic lymphocytic leukemia    • Cystocele    • Deep vein thrombosis of lower extremity    • Glaucoma    • History of radiation therapy    • Hyperlipidemia    • Hypertension    • Osteoarthritis    • Thrombocytopenia        Past Surgical History   Procedure Laterality Date   • Cholecystectomy     • Back surgery     • Breast biopsy  1992   • Breast lumpectomy         Family History   Problem Relation Age of Onset   • Alzheimer's disease Mother    • Emphysema Father    • Lung cancer Sister        Social History  "    Social History   • Marital status: Single     Spouse name: N/A   • Number of children: 3   • Years of education: N/A     Occupational History   •  Retired     Social History Main Topics   • Smoking status: Never Smoker   • Smokeless tobacco: Never Used   • Alcohol use Yes      Comment: Occasionally   • Drug use: Not on file   • Sexual activity: Not on file     Other Topics Concern   • Not on file     Social History Narrative       Current Outpatient Prescriptions on File Prior to Visit   Medication Sig Dispense Refill   • diclofenac (VOLTAREN) 1 % gel gel Place on the skin. Apply 4 grams to lower extremeties bid as needed     • dorzolamide (TRUSOPT) 2 % ophthalmic solution 1 drop 3 (three) times a day.     • lisinopril (PRINIVIL,ZESTRIL) 40 MG tablet TAKE ONE TABLET BY MOUTH DAILY 90 tablet 2   • meloxicam (MOBIC) 15 MG tablet TAKE ONE TABLET BY MOUTH DAILY WITH FOOD 30 tablet 3   • timolol (TIMOPTIC) 0.25 % ophthalmic solution 1 drop 2 (two) times a day.     • warfarin (COUMADIN) 2 MG tablet TAKE ONE AND ONE-HALF (1  1/2) TABLET BY MOUTH DAILY FIVE DAYS A WEEK AND TAKE TWO TABLETS BY MOUTH DAILY SATURDAY AND SUNDAY OR AS DIRECTED 90 tablet 0     No current facility-administered medications on file prior to visit.        Allergies   Allergen Reactions   • Morphine And Related    • Penicillins        Vitals:    02/07/17 0805   BP: 144/60   Pulse: 59   Resp: 18   SpO2: 98%   Weight: 182 lb (82.6 kg)   Height: 62.5\" (158.8 cm)       Body mass index is 32.76 kg/(m^2).    Objective   Physical Exam   Constitutional: She is oriented to person, place, and time. She appears well-developed and well-nourished. No distress.   HENT:   Head: Normocephalic and atraumatic.   Eyes: Conjunctivae are normal. No scleral icterus.   Neck: Normal range of motion. Neck supple. No muscular tenderness present.   Cardiovascular: Normal rate and regular rhythm.    No murmur heard.  Pulmonary/Chest: Effort normal and breath sounds normal. " No respiratory distress. She has no wheezes. She has no rales.   Musculoskeletal: She exhibits edema (1-2 edema ble.  stasis dermatitis ble).   Lymphadenopathy:     She has no cervical adenopathy.   Neurological: She is alert and oriented to person, place, and time. No cranial nerve deficit.   Psychiatric: She has a normal mood and affect. Her behavior is normal. Judgment and thought content normal.       Results for orders placed or performed in visit on 01/27/17   Lipid Panel   Result Value Ref Range    Total Cholesterol 179 0 - 200 mg/dL    Triglycerides 80 0 - 150 mg/dL    HDL Cholesterol 48 40 - 60 mg/dL    VLDL Cholesterol 16 5 - 40 mg/dL    LDL Cholesterol  115 (H) 0 - 100 mg/dL   Comprehensive Metabolic Panel   Result Value Ref Range    Glucose 90 65 - 99 mg/dL    BUN 20 8 - 23 mg/dL    Creatinine 0.86 0.57 - 1.00 mg/dL    eGFR Non African Am 62 >60 mL/min/1.73    eGFR African Am 75 >60 mL/min/1.73    BUN/Creatinine Ratio 23.3 7.0 - 25.0    Sodium 141 136 - 145 mmol/L    Potassium 3.9 3.5 - 5.2 mmol/L    Chloride 103 98 - 107 mmol/L    Total CO2 24.5 22.0 - 29.0 mmol/L    Calcium 9.3 8.2 - 9.6 mg/dL    Total Protein 6.0 6.0 - 8.5 g/dL    Albumin 3.60 3.50 - 5.20 g/dL    Globulin 2.4 gm/dL    A/G Ratio 1.5 g/dL    Total Bilirubin 0.6 0.1 - 1.2 mg/dL    Alkaline Phosphatase 56 39 - 117 U/L    AST (SGOT) 14 1 - 32 U/L    ALT (SGPT) 10 1 - 33 U/L   TSH Rfx On Abnormal To Free T4   Result Value Ref Range    TSH 2.46 0.27 - 4.2 mIU/mL   Hemoglobin A1c   Result Value Ref Range    Hemoglobin A1C 5.59 4.80 - 5.60 %   Manual Differential   Result Value Ref Range    Neutrophil Rel % 30.0 (L) 42.7 - 76.0 %    Lymphocyte Rel % 59.0 (H) 19.6 - 45.3 %    Monocyte Rel % 11.0 5.0 - 12.0 %    Eosinophil Rel % 0.0 (L) 0.3 - 6.2 %    Basophil Rel % 0.0 0.0 - 1.5 %    Neutrophils Absolute 2.98 1.90 - 8.10 10*3/mm3    Lymphocytes Absolute 5.86 (H) 0.90 - 4.80 10*3/mm3    Monocytes Absolute 1.09 0.20 - 1.20 10*3/mm3    Eosinophil  Abs 0.00 0.00 - 0.70 10*3/mm3    Basophils Absolute 0.00 0.00 - 0.20 10*3/mm3    Differential Comment Comment     Comment Comment     Plt Comment Comment    CBC & Differential   Result Value Ref Range    WBC 9.93 4.50 - 10.70 10*3/mm3    RBC 4.52 3.90 - 5.20 10*6/mm3    Hemoglobin 13.4 11.9 - 15.5 g/dL    Hematocrit 44.2 35.6 - 45.5 %    MCV 97.8 80.5 - 98.2 fL    MCH 29.6 26.9 - 32.0 pg    MCHC 30.3 (L) 32.4 - 36.3 g/dL    RDW 14.6 (H) 11.7 - 13.0 %    Platelets 93 (L) 140 - 500 10*3/mm3       Assessment/Plan   Diagnoses and all orders for this visit:    Benign essential HTN    Chronic kidney disease (CKD), stage III (moderate)    Prediabetes    Bilateral lower extremity edema        Discussion/Summary  Virginia is here for routine follow up.  At 89 she is doing very well.  Her bp is ok.  Her blood work all looks great.  I am going to add a very small dose of lasix for her to use as needed for her edema.  Advised to just take 10 mg as needed daily.  Her lisinopril should balance out the potassium loss.    No Follow-up on file.      Current Outpatient Prescriptions:   •  diclofenac (VOLTAREN) 1 % gel gel, Place on the skin. Apply 4 grams to lower extremeties bid as needed, Disp: , Rfl:   •  dorzolamide (TRUSOPT) 2 % ophthalmic solution, 1 drop 3 (three) times a day., Disp: , Rfl:   •  lisinopril (PRINIVIL,ZESTRIL) 40 MG tablet, TAKE ONE TABLET BY MOUTH DAILY, Disp: 90 tablet, Rfl: 2  •  meloxicam (MOBIC) 15 MG tablet, TAKE ONE TABLET BY MOUTH DAILY WITH FOOD, Disp: 30 tablet, Rfl: 3  •  timolol (TIMOPTIC) 0.25 % ophthalmic solution, 1 drop 2 (two) times a day., Disp: , Rfl:   •  warfarin (COUMADIN) 2 MG tablet, TAKE ONE AND ONE-HALF (1  1/2) TABLET BY MOUTH DAILY FIVE DAYS A WEEK AND TAKE TWO TABLETS BY MOUTH DAILY SATURDAY AND SUNDAY OR AS DIRECTED, Disp: 90 tablet, Rfl: 0  •  furosemide (LASIX) 20 MG tablet, Take 0.5 tablets by mouth Daily As Needed (edema)., Disp: 15 tablet, Rfl: 3         WDL

## 2019-06-05 ENCOUNTER — LAB (OUTPATIENT)
Dept: LAB | Facility: HOSPITAL | Age: 84
End: 2019-06-05

## 2019-06-05 ENCOUNTER — CLINICAL SUPPORT (OUTPATIENT)
Dept: ONCOLOGY | Facility: HOSPITAL | Age: 84
End: 2019-06-05

## 2019-06-05 DIAGNOSIS — D68.59 THROMBOPHILIA (HCC): Primary | ICD-10-CM

## 2019-06-05 LAB
INR PPP: 3.3 (ref 0.9–1.1)
PROTHROMBIN TIME: 39.6 SECONDS (ref 11–13.5)

## 2019-06-05 PROCEDURE — 85610 PROTHROMBIN TIME: CPT

## 2019-06-05 RX ORDER — LISINOPRIL 40 MG/1
40 TABLET ORAL DAILY
COMMUNITY

## 2019-06-05 NOTE — PROGRESS NOTES
INR 3.3. Pt denies any new meds, diet changes or missed doses. shes had diarrhea for the last couple of days advised pt to take Imodium and call her PCP if that doesn't help. Prior dose confirmed, Per [facility] pt is to take 3 mg of Coumadin on Sun,Thur and 2 mg all other days. Pt is here for lab with RN review.   Copy of labs given to pt and f/u appt reviewed. Pt is instructed to call with concerns prior to next visit. Pt V/U.

## 2019-07-10 ENCOUNTER — APPOINTMENT (OUTPATIENT)
Dept: CT IMAGING | Facility: HOSPITAL | Age: 84
End: 2019-07-10

## 2019-07-10 ENCOUNTER — CLINICAL SUPPORT (OUTPATIENT)
Dept: ONCOLOGY | Facility: HOSPITAL | Age: 84
End: 2019-07-10

## 2019-07-10 ENCOUNTER — LAB (OUTPATIENT)
Dept: LAB | Facility: HOSPITAL | Age: 84
End: 2019-07-10

## 2019-07-10 ENCOUNTER — HOSPITAL ENCOUNTER (EMERGENCY)
Facility: HOSPITAL | Age: 84
Discharge: HOME | End: 2019-07-10
Attending: EMERGENCY MEDICINE | Admitting: EMERGENCY MEDICINE

## 2019-07-10 VITALS
WEIGHT: 180 LBS | HEIGHT: 64 IN | SYSTOLIC BLOOD PRESSURE: 183 MMHG | HEART RATE: 56 BPM | BODY MASS INDEX: 30.73 KG/M2 | RESPIRATION RATE: 18 BRPM | DIASTOLIC BLOOD PRESSURE: 84 MMHG | OXYGEN SATURATION: 97 % | TEMPERATURE: 97.7 F

## 2019-07-10 DIAGNOSIS — S00.93XA CONTUSION OF HEAD, UNSPECIFIED PART OF HEAD, INITIAL ENCOUNTER: Primary | ICD-10-CM

## 2019-07-10 DIAGNOSIS — Z79.01 LONG TERM CURRENT USE OF ANTICOAGULANT: Primary | ICD-10-CM
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
## 2019-07-10 LAB
INR PPP: 2.2 (ref 0.9–1.1)
PROTHROMBIN TIME: 25.9 SECONDS (ref 11–13.5)

## 2019-07-10 PROCEDURE — 70450 CT HEAD/BRAIN W/O DYE: CPT

## 2019-07-10 PROCEDURE — 99283 EMERGENCY DEPT VISIT LOW MDM: CPT

## 2019-07-10 PROCEDURE — 85610 PROTHROMBIN TIME: CPT

## 2019-07-10 NOTE — PROGRESS NOTES
Pt here for INR check.  INR 2.2.  No changes.     Pt states she fell at home and hit her head 2 days ago.  Did not go get it checked out.  Says she has had an intermittent HA since and can't lift her right arm above her shoulders. Daughter present and states they couldn't get her to go to the ER.  Explained to pt that because she is on coumadin, she should go get it checked out.  V/u and they will go take her over.

## 2019-07-10 NOTE — ED PROVIDER NOTES
EMERGENCY DEPARTMENT ENCOUNTER    CHIEF COMPLAINT  Chief Complaint: hip pain  History given by: patient  History limited by: none  Room Number: 07/07  PMD: Emeka Rojas MD      HPI:  Pt is a 89 y.o. female who presents complaining of L hip and R shoulder pain s/p mechanical fall 07/07/19. Did hit the back of head and on coumadin. She denies LOC, HA, neck pain, CP, abd pain, and other injuries or complaints. She has been ambulatory since the fall. She was seen at [facility] for INR check and sent to the ED after learning about the fall.    Duration/Onset/Timing: sudden, 07/07/19  Location: L hip  Radiation: none  Quality: 'pain'  Intensity/Severity: moderate  Associated Symptoms: R shoulder pain  Aggravating or Alleviating Factors: movement  Previous Episodes: none      PAST MEDICAL HISTORY  Active Ambulatory Problems     Diagnosis Date Noted   • Chronic kidney disease (CKD), stage III (moderate) (CMS/HCC) 01/25/2016   • Benign essential HTN 01/25/2016   • Gait instability 01/25/2016   • Prediabetes 01/25/2016   • Degenerative joint disease involving multiple joints 01/25/2016   • Disorder of peripheral nervous system 01/25/2016   • Primary osteoarthritis involving multiple joints 02/04/2016   • Long term current use of anticoagulant 05/25/2016   • Bilateral lower extremity edema 02/07/2017   • Thrombophilia (CMS/HCC) 02/09/2017   • CLL (chronic lymphocytic leukemia) (CMS/[facility]) 04/23/2019     Resolved Ambulatory Problems     Diagnosis Date Noted   • Broken lower leg 01/25/2016   • HLD (hyperlipidemia) 01/25/2016   • Gout of big toe 08/10/2016     Past Medical History:   Diagnosis Date   • Antiphospholipid syndrome (CMS/HCC)    • Atresia (acquired) of cervix    • Breast cancer (CMS/HCC)    • Chronic kidney disease    • Chronic lymphocytic leukemia (CMS/HCC)    • Cystocele    • Deep vein thrombosis of lower extremity (CMS/HCC)    • Glaucoma    • Gout of big toe 8/10/2016   • History of radiation therapy    •  Hyperlipidemia    • Hypertension    • Osteoarthritis    • Thrombocytopenia (CMS/HCC)        PAST SURGICAL HISTORY  Past Surgical History:   Procedure Laterality Date   • BACK SURGERY     • BREAST BIOPSY  1992   • BREAST LUMPECTOMY     • CHOLECYSTECTOMY         FAMILY HISTORY  Family History   Problem Relation Age of Onset   • Alzheimer's disease Mother    • Emphysema Father    • Lung cancer Sister        SOCIAL HISTORY  Social History     Socioeconomic History   • Marital status: Single     Spouse name: Not on file   • Number of children: 3   • Years of education: Not on file   • Highest education level: Not on file   Occupational History     Employer: RETIRED   Tobacco Use   • Smoking status: Never Smoker   • Smokeless tobacco: Never Used   Substance and Sexual Activity   • Alcohol use: Yes     Comment: Occasionally       ALLERGIES  Morphine and related and Penicillins    REVIEW OF SYSTEMS  Review of Systems   Constitutional: Negative for fever.   Respiratory: Negative for shortness of breath.    Cardiovascular: Negative for chest pain.   Gastrointestinal: Negative for abdominal pain, nausea and vomiting.   Musculoskeletal:        + R shoulder pain  + L hip pain   Neurological: Negative for headaches.       PHYSICAL EXAM  ED Triage Vitals [07/10/19 0838]   Temp Heart Rate Resp BP SpO2   97.7 °F (36.5 °C) 56 18 -- 97 %      Temp src Heart Rate Source Patient Position BP Location FiO2 (%)   Tympanic -- -- -- --       Physical Exam   Constitutional: She is oriented to person, place, and time. No distress.   Eyes: EOM are normal.   Neck: Normal range of motion.   Cardiovascular: Normal rate and regular rhythm.   Pulmonary/Chest: Effort normal and breath sounds normal. No respiratory distress.   Musculoskeletal:   No midline spinal tenderness. R shoulder nontender w/ FROM, hips nontender w/ PROM, no patellar tenderness, L hand amputation.   Neurological: She is alert and oriented to person, place, and time. She has  normal sensation and normal strength. She has a normal Straight Leg Raise Test.   Moves all extremities.   Skin: Skin is warm and dry.   Psychiatric: Affect normal.   Nursing note and vitals reviewed.      LAB RESULTS  Lab Results (last 24 hours)     Procedure Component Value Units Date/Time    Protime-INR, Fingerstick [[id number]]  (Abnormal) Collected:  07/10/19 0802    Specimen:  Capillary Blood Updated:  07/10/19 0811     Protime 25.9 Seconds      INR 2.20          I ordered the above labs and reviewed the results    RADIOLOGY  CT Head Without Contrast   Preliminary Result   No acute intracranial process identified.       Radiation dose reduction techniques were utilized, including automated   exposure control and exposure modulation based on body size.                   I ordered the above noted radiological studies. Interpreted by radiologist. Reviewed by me in PACS.       PROCEDURES  Procedures      PROGRESS AND CONSULTS  ED Course as of Jul 10 0931   Wed Jul 10, 2019   0928 9:28 AM  Patient with fall and head injury.  On coumadin.  INR 2.2.  Ct negative.  Will discharge home.  Follow up with PMD.  Instructed on possibility of delayed bleeding.  [SL]      ED Course User Index  [SL] Jose Sanabria MD     0926  BP- 179/62 HR- 56 Temp- 97.7 °F (36.5 °C) (Tympanic) O2 sat- 97%  Rechecked the patient who is in NAD and is resting comfortably. Discussed imaging being unremarkable and the plan to d/c w/ her to f/u w/ her PCP. Pt given return to ED instructions. Pt understands and agrees with the plan, all questions answered.      MEDICAL DECISION MAKING  Results were reviewed/discussed with the patient and they were also made aware of online access. Pt also made aware that some labs, such as cultures, will not be resulted during ER visit and follow up with PMD is necessary.     MDM  Number of Diagnoses or Management Options     Amount and/or Complexity of Data Reviewed  Clinical lab tests: reviewed (INR  2.20)  Tests in the radiology section of CPT®: reviewed (CT head-negative acute)  Independent visualization of images, tracings, or specimens: yes    Patient Progress  Patient progress: stable         DIAGNOSIS  Final diagnoses:   Contusion of head, unspecified part of head, initial encounter   Fall, initial encounter       DISPOSITION  DISCHARGE    Patient discharged in stable condition.    Reviewed implications of results, diagnosis, meds, responsibility to follow up, warning signs and symptoms of possible worsening, potential complications and reasons to return to ER.    Patient/Family voiced understanding of above instructions.    Discussed plan for discharge, as there is no emergent indication for admission. Patient referred to primary care provider for BP management due to today's BP. Pt/family is agreeable and understands need for follow up and repeat testing.  Pt is aware that discharge does not mean that nothing is wrong but it indicates no emergency is present that requires admission and they must continue care with follow-up as given below or physician of their choice.     FOLLOW-UP  Emeka Rojas MD  [address] [address]  [phone]               Medication List      No changes were made to your prescriptions during this visit.       Latest Documented Vital Signs:  As of 9:31 AM  BP- (!) 183/84 HR- 56 Temp- 97.7 °F (36.5 °C) (Tympanic) O2 sat- 97%    --  Documentation assistance provided by cheryl Esteban for Dr. Sanabria.  Information recorded by the scribe was done at my direction and has been verified and validated by me.     Debra Esteban  07/10/19 5719       Jose Sanabria MD  07/10/19 9328

## 2019-07-10 NOTE — ED TRIAGE NOTES
Patient presents to ED for fall. On Sunday patient lost her balance and fell backwards, hit head on laminate jackie. Patient denies LOC. Patient denies headache or dizziness. Patient on coumadin

## 2019-08-27 ENCOUNTER — CLINICAL SUPPORT (OUTPATIENT)
Dept: ONCOLOGY | Facility: HOSPITAL | Age: 84
End: 2019-08-27

## 2019-08-27 ENCOUNTER — APPOINTMENT (OUTPATIENT)
Dept: LAB | Facility: HOSPITAL | Age: 84
End: 2019-08-27

## 2019-08-27 DIAGNOSIS — Z79.01 LONG TERM CURRENT USE OF ANTICOAGULANT: Primary | ICD-10-CM

## 2019-08-27 LAB
INR PPP: 2.1 (ref 0.9–1.1)
PROTHROMBIN TIME: 25 SECONDS (ref 11–13.5)

## 2019-08-27 PROCEDURE — 85610 PROTHROMBIN TIME: CPT

## 2019-08-27 NOTE — PROGRESS NOTES
INR 2.1. Prior dose confirmed. Per [facility] pt is to take 3 mg of Coumadin on Sun,thur and 2 mg all other days. Pt has no complaints.  Copy of labs given to pt and f/u appt reviewed. Pt is instructed to call the office with any concerns or new symptoms prior to next visit. Pt vu

## 2019-10-08 ENCOUNTER — LAB (OUTPATIENT)
Dept: LAB | Facility: HOSPITAL | Age: 84
End: 2019-10-08

## 2019-10-08 ENCOUNTER — CLINICAL SUPPORT (OUTPATIENT)
Dept: ONCOLOGY | Facility: HOSPITAL | Age: 84
End: 2019-10-08

## 2019-10-08 VITALS
DIASTOLIC BLOOD PRESSURE: 60 MMHG | SYSTOLIC BLOOD PRESSURE: 180 MMHG | TEMPERATURE: 97.6 F | OXYGEN SATURATION: 98 % | HEART RATE: 51 BPM

## 2019-10-08 DIAGNOSIS — Z79.01 LONG TERM CURRENT USE OF ANTICOAGULANT: Primary | ICD-10-CM

## 2019-10-08 DIAGNOSIS — D68.59 THROMBOPHILIA (HCC): ICD-10-CM

## 2019-10-08 LAB
ALBUMIN SERPL-MCNC: 3.8 G/DL (ref 3.5–5.2)
ALBUMIN/GLOB SERPL: 1.3 G/DL (ref 1.1–2.4)
ALP SERPL-CCNC: 58 U/L (ref 38–116)
ALT SERPL W P-5'-P-CCNC: 7 U/L (ref 0–33)
ANION GAP SERPL CALCULATED.3IONS-SCNC: 11.8 MMOL/L (ref 5–15)
AST SERPL-CCNC: 13 U/L (ref 0–32)
BASOPHILS # BLD AUTO: 0.02 10*3/MM3 (ref 0–0.2)
BASOPHILS NFR BLD AUTO: 0.2 % (ref 0–1.5)
BILIRUB SERPL-MCNC: 0.6 MG/DL (ref 0.2–1.2)
BUN BLD-MCNC: 20 MG/DL (ref 6–20)
BUN/CREAT SERPL: 21.5 (ref 7.3–30)
CALCIUM SPEC-SCNC: 9.4 MG/DL (ref 8.5–10.2)
CHLORIDE SERPL-SCNC: 107 MMOL/L (ref 98–107)
CO2 SERPL-SCNC: 24.2 MMOL/L (ref 22–29)
CREAT BLD-MCNC: 0.93 MG/DL (ref 0.6–1.1)
DEPRECATED RDW RBC AUTO: 51.8 FL (ref 37–54)
EOSINOPHIL # BLD AUTO: 0 10*3/MM3 (ref 0–0.4)
EOSINOPHIL NFR BLD AUTO: 0 % (ref 0.3–6.2)
ERYTHROCYTE [DISTWIDTH] IN BLOOD BY AUTOMATED COUNT: 14.2 % (ref 12.3–15.4)
GFR SERPL CREATININE-BSD FRML MDRD: 56 ML/MIN/1.73
GLOBULIN UR ELPH-MCNC: 2.9 GM/DL (ref 1.8–3.5)
GLUCOSE BLD-MCNC: 96 MG/DL (ref 74–124)
HCT VFR BLD AUTO: 43.6 % (ref 34–46.6)
HGB BLD-MCNC: 13.3 G/DL (ref 12–15.9)
IMM GRANULOCYTES # BLD AUTO: 0.01 10*3/MM3 (ref 0–0.05)
IMM GRANULOCYTES NFR BLD AUTO: 0.1 % (ref 0–0.5)
INR PPP: 2 (ref 0.9–1.1)
LYMPHOCYTES # BLD AUTO: 7.51 10*3/MM3 (ref 0.7–3.1)
LYMPHOCYTES NFR BLD AUTO: 74.1 % (ref 19.6–45.3)
MCH RBC QN AUTO: 30 PG (ref 26.6–33)
MCHC RBC AUTO-ENTMCNC: 30.5 G/DL (ref 31.5–35.7)
MCV RBC AUTO: 98.4 FL (ref 79–97)
MONOCYTES # BLD AUTO: 0.67 10*3/MM3 (ref 0.1–0.9)
MONOCYTES NFR BLD AUTO: 6.6 % (ref 5–12)
NEUTROPHILS # BLD AUTO: 1.93 10*3/MM3 (ref 1.7–7)
NEUTROPHILS NFR BLD AUTO: 19 % (ref 42.7–76)
NRBC BLD AUTO-RTO: 0 /100 WBC (ref 0–0.2)
PLATELET # BLD AUTO: 90 10*3/MM3 (ref 140–450)
PMV BLD AUTO: 11.7 FL (ref 6–12)
POTASSIUM BLD-SCNC: 4.3 MMOL/L (ref 3.5–4.7)
PROT SERPL-MCNC: 6.7 G/DL (ref 6.3–8)
PROTHROMBIN TIME: 24 SECONDS (ref 11–13.5)
RBC # BLD AUTO: 4.43 10*6/MM3 (ref 3.77–5.28)
SODIUM BLD-SCNC: 143 MMOL/L (ref 134–145)
WBC NRBC COR # BLD: 10.14 10*3/MM3 (ref 3.4–10.8)

## 2019-10-08 PROCEDURE — 85025 COMPLETE CBC W/AUTO DIFF WBC: CPT

## 2019-10-08 PROCEDURE — 85610 PROTHROMBIN TIME: CPT

## 2019-10-08 PROCEDURE — 80053 COMPREHEN METABOLIC PANEL: CPT

## 2019-10-08 PROCEDURE — 36415 COLL VENOUS BLD VENIPUNCTURE: CPT

## 2019-10-08 NOTE — PROGRESS NOTES
Pt is here for lab with RN review.  CBC reviewed with pt, counts are stable for this pt at this time. Pt has no complaints. BP was slightly elevated. Pt will monitor BP at home and contact PCP if it stays elevated. INR 2.0. Prior dose confirmed. Per [facility] pt is to take 3 mg of Coumadin on Sun,Tue,THur and 2 mg all other days.   Copy of labs and AVS given to pt and f/u appt reviewed. Pt is instructed to call the office with any concerns or new symptoms prior to next visit. Pt vu.   Lab Results   Component Value Date    WBC 10.14 10/08/2019    HGB 13.3 10/08/2019    HCT 43.6 10/08/2019    MCV 98.4 (H) 10/08/2019    PLT 90 (L) 10/08/2019

## 2019-10-23 ENCOUNTER — APPOINTMENT (OUTPATIENT)
Dept: LAB | Facility: HOSPITAL | Age: 84
End: 2019-10-23

## 2019-10-23 ENCOUNTER — OFFICE VISIT (OUTPATIENT)
Dept: ONCOLOGY | Facility: CLINIC | Age: 84
End: 2019-10-23

## 2019-10-23 VITALS
RESPIRATION RATE: 18 BRPM | TEMPERATURE: 97.9 F | SYSTOLIC BLOOD PRESSURE: 157 MMHG | HEART RATE: 59 BPM | DIASTOLIC BLOOD PRESSURE: 86 MMHG | WEIGHT: 180.5 LBS | BODY MASS INDEX: 30.81 KG/M2 | OXYGEN SATURATION: 97 % | HEIGHT: 64 IN

## 2019-10-23 DIAGNOSIS — Z79.01 LONG TERM CURRENT USE OF ANTICOAGULANT: ICD-10-CM

## 2019-10-23 DIAGNOSIS — D68.59 THROMBOPHILIA (HCC): Primary | ICD-10-CM

## 2019-10-23 DIAGNOSIS — C91.10 CLL (CHRONIC LYMPHOCYTIC LEUKEMIA) (HCC): Primary | ICD-10-CM

## 2019-10-23 DIAGNOSIS — N18.30 CHRONIC KIDNEY DISEASE (CKD), STAGE III (MODERATE) (HCC): ICD-10-CM

## 2019-10-23 DIAGNOSIS — D69.6 THROMBOCYTOPENIA (HCC): ICD-10-CM

## 2019-10-23 LAB
BASOPHILS # BLD AUTO: 0.01 10*3/MM3 (ref 0–0.2)
BASOPHILS NFR BLD AUTO: 0.1 % (ref 0–1.5)
DEPRECATED RDW RBC AUTO: 50.3 FL (ref 37–54)
EOSINOPHIL # BLD AUTO: 0.12 10*3/MM3 (ref 0–0.4)
EOSINOPHIL NFR BLD AUTO: 1.1 % (ref 0.3–6.2)
ERYTHROCYTE [DISTWIDTH] IN BLOOD BY AUTOMATED COUNT: 14.3 % (ref 12.3–15.4)
HCT VFR BLD AUTO: 43.5 % (ref 34–46.6)
HGB BLD-MCNC: 13.8 G/DL (ref 12–15.9)
IMM GRANULOCYTES # BLD AUTO: 0.01 10*3/MM3 (ref 0–0.05)
IMM GRANULOCYTES NFR BLD AUTO: 0.1 % (ref 0–0.5)
LYMPHOCYTES # BLD AUTO: 7.78 10*3/MM3 (ref 0.7–3.1)
LYMPHOCYTES NFR BLD AUTO: 73.7 % (ref 19.6–45.3)
MCH RBC QN AUTO: 30.3 PG (ref 26.6–33)
MCHC RBC AUTO-ENTMCNC: 31.7 G/DL (ref 31.5–35.7)
MCV RBC AUTO: 95.6 FL (ref 79–97)
MONOCYTES # BLD AUTO: 0.73 10*3/MM3 (ref 0.1–0.9)
MONOCYTES NFR BLD AUTO: 6.9 % (ref 5–12)
NEUTROPHILS # BLD AUTO: 1.91 10*3/MM3 (ref 1.7–7)
NEUTROPHILS NFR BLD AUTO: 18.1 % (ref 42.7–76)
NRBC BLD AUTO-RTO: 0.2 /100 WBC (ref 0–0.2)
PLATELET # BLD AUTO: 67 10*3/MM3 (ref 140–450)
PMV BLD AUTO: 11.2 FL (ref 6–12)
RBC # BLD AUTO: 4.55 10*6/MM3 (ref 3.77–5.28)
WBC NRBC COR # BLD: 10.56 10*3/MM3 (ref 3.4–10.8)

## 2019-10-23 PROCEDURE — 99214 OFFICE O/P EST MOD 30 MIN: CPT | Performed by: INTERNAL MEDICINE

## 2019-10-23 PROCEDURE — 36415 COLL VENOUS BLD VENIPUNCTURE: CPT

## 2019-10-23 PROCEDURE — G0463 HOSPITAL OUTPT CLINIC VISIT: HCPCS | Performed by: INTERNAL MEDICINE

## 2019-10-23 PROCEDURE — 85025 COMPLETE CBC W/AUTO DIFF WBC: CPT

## 2019-10-23 NOTE — PROGRESS NOTES
Subjective .     REASONS FOR FOLLOWUP:    1. Abnormal mammogram 6/09.    2. Remote history of breast cancer, status post lumpectomy and radiation therapy (right).    3. History of CLL, stage 0.    4. Thrombophilia.   5. Thrombocytopenia with platelet count around 70,000     History of Present Illness     Mrs Benavidez returns today feeling well, overall.  Her INR today is within the therapeutic range at 2.7.  She is currently on 19 mg of Coumadin daily.  She denies any excess bleeding or bruising.  No chest pain, shortness of breath, or worsening lower extremity edema.  Patient does have some age-related skin changes as well as some areas of psoriatic flare.  This has been a chronic issue.    In regards to her CLL, she denies any unintentional weight loss, drenching night sweats, or new adenopathies or nodules.  Her total white blood cell count today is normal at 10.5.  Her hemoglobin is also within normal limits at 13.8.  She has chronic thrombocytopenia with a platelet count today of 67,000 again without any bleeding issues.      Past Medical History:   Diagnosis Date   • Antiphospholipid syndrome (CMS/HCC)    • Atresia (acquired) of cervix     atresia   • Breast cancer (CMS/HCC)     2005 with lumpectomy   • Chronic kidney disease     Stage III   • Chronic lymphocytic leukemia (CMS/HCC)    • Cystocele    • Deep vein thrombosis of lower extremity (CMS/HCC)    • Glaucoma    • Gout of big toe 8/10/2016   • History of radiation therapy    • Hyperlipidemia    • Hypertension    • Osteoarthritis    • Thrombocytopenia (CMS/HCC)        ONCOLOGIC HISTORY:  (History from previous dates can be found in the separate document.)    Current Outpatient Medications on File Prior to Visit   Medication Sig Dispense Refill   • diclofenac (VOLTAREN) 1 % gel gel Apply 4 grams to lower extremeties bid as needed 100 g 1   • dorzolamide (TRUSOPT) 2 % ophthalmic solution 1 drop 3 (three) times a day.     • lisinopril (PRINIVIL,ZESTRIL) 40 MG  "tablet Take 40 mg by mouth Daily.     • meloxicam (MOBIC) 15 MG tablet TAKE ONE TABLET BY MOUTH DAILY WITH FOOD 30 tablet 3   • timolol (TIMOPTIC) 0.25 % ophthalmic solution 1 drop 2 (two) times a day.     • warfarin (COUMADIN) 2 MG tablet Take 2 mg on Monday and Friday and 3 mg on the remaining days unless directed by physician 90 tablet 2     No current facility-administered medications on file prior to visit.        ALLERGIES:     Allergies   Allergen Reactions   • Morphine And Related    • Penicillins        Social History     Socioeconomic History   • Marital status: Single     Spouse name: Not on file   • Number of children: 3   • Years of education: Not on file   • Highest education level: Not on file   Occupational History     Employer: RETIRED   Tobacco Use   • Smoking status: Never Smoker   • Smokeless tobacco: Never Used   Substance and Sexual Activity   • Alcohol use: Yes     Comment: Occasionally         Cancer-related family history includes Lung cancer in her sister.     Review of Systems  A comprehensive 14 point review of systems was performed and was negative except as mentioned.    Objective      Vitals:    10/23/19 0912   BP: 157/86   Pulse: 59   Resp: 18   Temp: 97.9 °F (36.6 °C)   SpO2: 97%   Weight: 81.9 kg (180 lb 8 oz)   Height: 162.6 cm (64.02\")   PainSc: 10-Worst pain ever   PainLoc: Knee  Comment: both knees     Current Status 10/23/2019   ECOG score 1       Physical Exam   Constitutional: She appears well-developed and well-nourished.   HENT:   Head: Normocephalic.   Neck: Neck supple.   Cardiovascular: Normal rate.   Pulmonary/Chest: Effort normal and breath sounds normal.   Abdominal: Soft. She exhibits no mass.   Musculoskeletal: She exhibits no edema.   Lymphadenopathy:     She has no cervical adenopathy.   Skin: Skin is warm and dry.   Psychiatric: She has a normal mood and affect. Judgment and thought content normal.   Vitals reviewed.   Transported via wheelchair, with " daughter  Areas of skin discoloration with age-related changes     RECENT LABS:  Lab Results   Component Value Date    WBC 10.56 10/23/2019    HGB 13.8 10/23/2019    HCT 43.5 10/23/2019    MCV 95.6 10/23/2019    PLT 67 (L) 10/23/2019     Lab Results   Component Value Date    INR 2.00 (H) 10/08/2019    INR 2.10 (H) 08/27/2019    INR 2.20 (H) 07/10/2019    PROTIME 24.0 (H) 10/08/2019    PROTIME 25.0 (H) 08/27/2019    PROTIME 25.9 (H) 07/10/2019            Assessment/Plan       1.  CLL.  Her blood counts are stable no indication to initiate therapy at this time  2.  Thrombocytopenia.  Her platelet count slightly lower today but remains in a safe range.  3.  Chronic Coumadin anticoagulation with therapeutic INR today of 2.0    Plan  1.  Patient will return every 2 months for lab and RN review with CBC and INR.  2.  MD follow-up in 6 months with CBC, INR, and CMP.  3.  We did request an immature platelet fraction with today's lab also.

## 2019-11-19 ENCOUNTER — HOSPITAL ENCOUNTER (EMERGENCY)
Facility: HOSPITAL | Age: 84
Discharge: HOME | End: 2019-11-19
Attending: EMERGENCY MEDICINE | Admitting: EMERGENCY MEDICINE

## 2019-11-19 ENCOUNTER — APPOINTMENT (OUTPATIENT)
Dept: CT IMAGING | Facility: HOSPITAL | Age: 84
End: 2019-11-19

## 2019-11-19 VITALS
HEART RATE: 62 BPM | SYSTOLIC BLOOD PRESSURE: 136 MMHG | HEIGHT: 64 IN | OXYGEN SATURATION: 98 % | RESPIRATION RATE: 16 BRPM | WEIGHT: 171 LBS | DIASTOLIC BLOOD PRESSURE: 57 MMHG | TEMPERATURE: 98.7 F | BODY MASS INDEX: 29.19 KG/M2
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
## 2019-11-19 DIAGNOSIS — S00.03XA HEMATOMA OF SCALP, INITIAL ENCOUNTER: ICD-10-CM

## 2019-11-19 LAB
ANION GAP SERPL CALCULATED.3IONS-SCNC: 9.6 MMOL/L (ref 5–15)
BUN BLD-MCNC: 23 MG/DL (ref 8–23)
BUN/CREAT SERPL: 25 (ref 7–25)
CALCIUM SPEC-SCNC: 9.4 MG/DL (ref 8.2–9.6)
CHLORIDE SERPL-SCNC: 108 MMOL/L (ref 98–107)
CO2 SERPL-SCNC: 25.4 MMOL/L (ref 22–29)
CREAT BLD-MCNC: 0.92 MG/DL (ref 0.57–1)
DEPRECATED RDW RBC AUTO: 46.5 FL (ref 37–54)
ERYTHROCYTE [DISTWIDTH] IN BLOOD BY AUTOMATED COUNT: 13.7 % (ref 12.3–15.4)
GFR SERPL CREATININE-BSD FRML MDRD: 57 ML/MIN/1.73
GLUCOSE BLD-MCNC: 101 MG/DL (ref 65–99)
HCT VFR BLD AUTO: 43.4 % (ref 34–46.6)
HGB BLD-MCNC: 13.9 G/DL (ref 12–15.9)
INR PPP: 2.69 (ref 0.9–1.1)
LYMPHOCYTES # BLD MANUAL: 6.88 10*3/MM3 (ref 0.7–3.1)
LYMPHOCYTES NFR BLD MANUAL: 4.8 % (ref 5–12)
LYMPHOCYTES NFR BLD MANUAL: 59.5 % (ref 19.6–45.3)
MCH RBC QN AUTO: 30 PG (ref 26.6–33)
MCHC RBC AUTO-ENTMCNC: 32 G/DL (ref 31.5–35.7)
MCV RBC AUTO: 93.5 FL (ref 79–97)
MONOCYTES # BLD AUTO: 0.56 10*3/MM3 (ref 0.1–0.9)
NEUTROPHILS # BLD AUTO: 4.13 10*3/MM3 (ref 1.7–7)
NEUTROPHILS NFR BLD MANUAL: 35.7 % (ref 42.7–76)
PLAT MORPH BLD: NORMAL
PLATELET # BLD AUTO: 86 10*3/MM3 (ref 140–450)
PMV BLD AUTO: 12.3 FL (ref 6–12)
POTASSIUM BLD-SCNC: 4 MMOL/L (ref 3.5–5.2)
PROTHROMBIN TIME: 28.3 SECONDS (ref 11.7–14.2)
RBC # BLD AUTO: 4.64 10*6/MM3 (ref 3.77–5.28)
RBC MORPH BLD: NORMAL
SMUDGE CELLS BLD QL SMEAR: ABNORMAL
SODIUM BLD-SCNC: 143 MMOL/L (ref 136–145)
WBC NRBC COR # BLD: 11.57 10*3/MM3 (ref 3.4–10.8)

## 2019-11-19 PROCEDURE — 70450 CT HEAD/BRAIN W/O DYE: CPT

## 2019-11-19 PROCEDURE — 85025 COMPLETE CBC W/AUTO DIFF WBC: CPT | Performed by: EMERGENCY MEDICINE

## 2019-11-19 PROCEDURE — 85007 BL SMEAR W/DIFF WBC COUNT: CPT | Performed by: EMERGENCY MEDICINE

## 2019-11-19 PROCEDURE — 85610 PROTHROMBIN TIME: CPT | Performed by: EMERGENCY MEDICINE

## 2019-11-19 PROCEDURE — 80048 BASIC METABOLIC PNL TOTAL CA: CPT | Performed by: EMERGENCY MEDICINE

## 2019-11-19 PROCEDURE — 99283 EMERGENCY DEPT VISIT LOW MDM: CPT

## 2019-11-19 RX ORDER — SODIUM CHLORIDE 0.9 % (FLUSH) 0.9 %
10 SYRINGE (ML) INJECTION AS NEEDED
Status: DISCONTINUED | OUTPATIENT
Start: 2019-11-19 | End: 2019-11-19 | Stop reason: HOSPADM

## 2019-11-19 NOTE — ED TRIAGE NOTES
Patient to er with c/o fall and injury to head. No loc reported. Patient fell while trying to open a door and fell backward.

## 2019-11-19 NOTE — ED PROVIDER NOTES
EMERGENCY DEPARTMENT ENCOUNTER    Room Number:  25/25  Date seen:  11/19/2019  Time seen: 4:33 PM  PCP: Emeka Rojas MD  Historian: self      HPI:  Chief Complaint: head injury  Context: Virginia Benavidez is a 89 y.o. female who presents to the ED c/o a head injury that occurred after a mechanical fall today. Pt denies nausea, vomiting, HA and LOC. The pt states she was having trouble opening a door and fell backwards, hitting her head. She did not lose consciousness or vomit after the fall. Pt is anticoagulated on Warfarin. Hx of DVT, CKD, breast cancer, HTN and antiphospholipid syndrome.      PAST MEDICAL HISTORY  Active Ambulatory Problems     Diagnosis Date Noted   • Chronic kidney disease (CKD), stage III (moderate) (CMS/HCC) 01/25/2016   • Benign essential HTN 01/25/2016   • Gait instability 01/25/2016   • Prediabetes 01/25/2016   • Degenerative joint disease involving multiple joints 01/25/2016   • Disorder of peripheral nervous system 01/25/2016   • Primary osteoarthritis involving multiple joints 02/04/2016   • Long term current use of anticoagulant 05/25/2016   • Bilateral lower extremity edema 02/07/2017   • Thrombophilia (CMS/HCC) 02/09/2017   • CLL (chronic lymphocytic leukemia) (CMS/HCC) 04/23/2019   • Thrombocytopenia (CMS/HCC) 10/23/2019     Resolved Ambulatory Problems     Diagnosis Date Noted   • Broken lower leg 01/25/2016   • HLD (hyperlipidemia) 01/25/2016   • Gout of big toe 08/10/2016     Past Medical History:   Diagnosis Date   • Antiphospholipid syndrome (CMS/HCC)    • Atresia (acquired) of cervix    • Breast cancer (CMS/HCC)    • Chronic kidney disease    • Chronic lymphocytic leukemia (CMS/HCC)    • Cystocele    • Deep vein thrombosis of lower extremity (CMS/HCC)    • Glaucoma    • Gout of big toe 8/10/2016   • History of radiation therapy    • Hyperlipidemia    • Hypertension    • Osteoarthritis    • Thrombocytopenia (CMS/HCC)          PAST SURGICAL HISTORY  Past Surgical History:    Procedure Laterality Date   • BACK SURGERY     • BREAST BIOPSY  1992   • BREAST LUMPECTOMY     • CHOLECYSTECTOMY           FAMILY HISTORY  Family History   Problem Relation Age of Onset   • Alzheimer's disease Mother    • Emphysema Father    • Lung cancer Sister          SOCIAL HISTORY  Social History     Socioeconomic History   • Marital status: Single     Spouse name: Not on file   • Number of children: 3   • Years of education: Not on file   • Highest education level: Not on file   Occupational History     Employer: RETIRED   Tobacco Use   • Smoking status: Never Smoker   • Smokeless tobacco: Never Used   Substance and Sexual Activity   • Alcohol use: No     Frequency: Never     Comment: Occasionally   • Drug use: No         ALLERGIES  Morphine and related and Penicillins        REVIEW OF SYSTEMS  Review of Systems   Constitutional: Negative for diaphoresis and fever.   HENT: Negative for congestion.    Eyes: Negative for visual disturbance.   Respiratory: Negative for shortness of breath.    Cardiovascular: Negative for palpitations.   Gastrointestinal: Negative for blood in stool and vomiting.   Endocrine: Negative for polyuria.   Genitourinary: Negative for flank pain.   Musculoskeletal: Negative for joint swelling.   Skin: Positive for wound (head injury).   Neurological: Negative for seizures.   Hematological: Negative for adenopathy.   Psychiatric/Behavioral: Negative for sleep disturbance.        PHYSICAL EXAM  ED Triage Vitals [11/19/19 1601]   Temp Heart Rate Resp BP SpO2   98.7 °F (37.1 °C) 78 18 (!) 193/74 94 %      Temp src Heart Rate Source Patient Position BP Location FiO2 (%)   Tympanic -- -- -- --         GENERAL: no acute distress  HENT: nares patent, 4 cm occipital scalp hematoma, no laceration or abrasion  EYES: no scleral icterus, pupils are 4 mm reactive bilatera  CV: regular rhythm, normal rate  RESPIRATORY: normal effort, CTAB  ABDOMEN: soft, non-tender throughout  MUSCULOSKELETAL: no  deformity  NEURO: alert, moves all extremities, follows commands  SKIN: warm, dry    Vital signs and nursing notes reviewed.      LAB RESULTS  Recent Results (from the past 24 hour(s))   Basic Metabolic Panel    Collection Time: 11/19/19  4:32 PM   Result Value Ref Range    Glucose 101 (H) 65 - 99 mg/dL    BUN 23 8 - 23 mg/dL    Creatinine 0.92 0.57 - 1.00 mg/dL    Sodium 143 136 - 145 mmol/L    Potassium 4.0 3.5 - 5.2 mmol/L    Chloride 108 (H) 98 - 107 mmol/L    CO2 25.4 22.0 - 29.0 mmol/L    Calcium 9.4 8.2 - 9.6 mg/dL    eGFR Non African Amer 57 (L) >60 mL/min/1.73    BUN/Creatinine Ratio 25.0 7.0 - 25.0    Anion Gap 9.6 5.0 - 15.0 mmol/L   Protime-INR    Collection Time: 11/19/19  4:32 PM   Result Value Ref Range    Protime 28.3 (H) 11.7 - 14.2 Seconds    INR 2.69 (H) 0.90 - 1.10   CBC Auto Differential    Collection Time: 11/19/19  4:32 PM   Result Value Ref Range    WBC 11.57 (H) 3.40 - 10.80 10*3/mm3    RBC 4.64 3.77 - 5.28 10*6/mm3    Hemoglobin 13.9 12.0 - 15.9 g/dL    Hematocrit 43.4 34.0 - 46.6 %    MCV 93.5 79.0 - 97.0 fL    MCH 30.0 26.6 - 33.0 pg    MCHC 32.0 31.5 - 35.7 g/dL    RDW 13.7 12.3 - 15.4 %    RDW-SD 46.5 37.0 - 54.0 fl    MPV 12.3 (H) 6.0 - 12.0 fL    Platelets 86 (L) 140 - 450 10*3/mm3   Manual Differential    Collection Time: 11/19/19  4:32 PM   Result Value Ref Range    Neutrophil % 35.7 (L) 42.7 - 76.0 %    Lymphocyte % 59.5 (H) 19.6 - 45.3 %    Monocyte % 4.8 (L) 5.0 - 12.0 %    Neutrophils Absolute 4.13 1.70 - 7.00 10*3/mm3    Lymphocytes Absolute 6.88 (H) 0.70 - 3.10 10*3/mm3    Monocytes Absolute 0.56 0.10 - 0.90 10*3/mm3    RBC Morphology Normal Normal    Smudge Cells Large/3+ None Seen    Platelet Morphology Normal Normal       Ordered the above labs and reviewed the results.    RADIOLOGY  Ct Head Without Contrast    Result Date: 11/19/2019  CT HEAD WITHOUT CONTRAST  HISTORY: Fall, on Coumadin.  COMPARISON: CT head 07/10/2019.  FINDINGS: There is moderate atrophy. There is no  evidence of intracranial hemorrhage, hydrocephalus or of abnormal extra-axial fluid. No focal area of decreased attenuation to suggest acute infarction is identified. Moderate vascular calcification is noted. There is no evidence of a calvarial fracture.      No evidence of fracture or hemorrhage.  There is age-appropriate atrophy and small vessel ischemic disease.  The above information was called to and discussed with Dr. Butt.    Radiation dose reduction techniques were utilized, including automated exposure control and exposure modulation based on body size.         Ordered the above noted radiological studies. Reviewed by me in PACS.  Spoke with Dr. Iniguez (radiologist) regarding results.      MEDICATIONS GIVEN IN ER  Medications   sodium chloride 0.9 % flush 10 mL (not administered)       MEDICAL DECISION MAKING and ED Course       1611 BMP ordered. INR ordered. Labs ordered. CT Head without contrast ordered.     1730 Discussed pt w/Dr. Iniguez (Radiology) regarding CT Head which shows no fracture or hemorrhage.    1752 Patient is resting comfortably and in NAD. Patient is stable. BP- (!) 193/74 HR- 57 Temp- 98.7 °F (37.1 °C) (Tympanic) O2 sat- 98%. Informed the patient results of CT Head shows no fx or hemorrhage. Discussed the plan for d/c with f/u to PCP as needed. Pt understands and agrees with the plan, all questions answered.      MDM  Number of Diagnoses or Management Options  Fall, initial encounter:   Hematoma of scalp, initial encounter:      Amount and/or Complexity of Data Reviewed  Clinical lab tests: ordered and reviewed (INR 2.69  WBC 11.57)  Tests in the radiology section of CPT®: ordered and reviewed (CT Head : no fx or hemorrhage)  Discussion of test results with the performing providers: yes (Dr. Iniguez (Radiology))  Decide to obtain previous medical records or to obtain history from someone other than the patient: yes  Independent visualization of images, tracings, or specimens: yes            DIAGNOSIS  Final diagnoses:   Fall, initial encounter   Hematoma of scalp, initial encounter     DISPOSITION  DISCHARGE    Patient discharged in stable condition.    Reviewed implications of results, diagnosis, meds, responsibility to follow up, warning signs and symptoms of possible worsening, potential complications and reasons to return to ER.    Patient/Family voiced understanding of above instructions.    Discussed plan for discharge, as there is no emergent indication for admission. Patient referred to primary care provider for BP management due to today's BP. Pt/family is agreeable and understands need for follow up and repeat testing.  Pt is aware that discharge does not mean that nothing is wrong but it indicates no emergency is present that requires admission and they must continue care with follow-up as given below or physician of their choice.     FOLLOW-UP  Emeka Rojas MD  [address] [address]  [phone]      As needed         Medication List      No changes were made to your prescriptions during this visit.         Latest Documented Vital Signs:  As of 5:54 PM  BP- (!) 193/74 HR- 57 Temp- 98.7 °F (37.1 °C) (Tympanic) O2 sat- 98%    --  Documentation assistance provided by cheryl Olivo for Dr. JEANNIE Butt MD.  Information recorded by the cheryl was done at my direction and has been verified and validated by me.      Please note that portions of this were completed with a voice recognition program.        Geovani Barclay  11/19/19 [id number]       Johnathon Butt MD  11/19/19 [id number]

## 2019-11-22 RX ORDER — WARFARIN SODIUM 2 MG/1
TABLET ORAL
Qty: 90 TABLET | Refills: 1 | Status: SHIPPED | OUTPATIENT
Start: 2019-11-22 | End: 2020-01-01 | Stop reason: SDUPTHER

## 2019-11-29 ENCOUNTER — EPISODE CHANGES (OUTPATIENT)
Dept: CASE MANAGEMENT | Facility: OTHER | Age: 84
End: 2019-11-29

## 2019-12-04 ENCOUNTER — TRANSCRIBE ORDERS (OUTPATIENT)
Dept: ADMINISTRATIVE | Facility: HOSPITAL | Age: 84
End: 2019-12-04

## 2019-12-04 DIAGNOSIS — Z12.31 VISIT FOR SCREENING MAMMOGRAM: Primary | ICD-10-CM

## 2019-12-13 ENCOUNTER — RESULTS ENCOUNTER (OUTPATIENT)
Dept: ONCOLOGY | Facility: CLINIC | Age: 84
End: 2019-12-13

## 2019-12-13 ENCOUNTER — EPISODE CHANGES (OUTPATIENT)
Dept: CASE MANAGEMENT | Facility: OTHER | Age: 84
End: 2019-12-13

## 2019-12-13 ENCOUNTER — PATIENT OUTREACH (OUTPATIENT)
Dept: CASE MANAGEMENT | Facility: OTHER | Age: 84
End: 2019-12-13

## 2019-12-13 DIAGNOSIS — R73.03 PREDIABETES: ICD-10-CM

## 2019-12-13 DIAGNOSIS — E78.89 LIPIDS ABNORMAL: ICD-10-CM

## 2019-12-13 DIAGNOSIS — Z00.00 HEALTHCARE MAINTENANCE: Primary | ICD-10-CM

## 2019-12-13 DIAGNOSIS — Z79.01 LONG TERM CURRENT USE OF ANTICOAGULANT: ICD-10-CM

## 2019-12-13 DIAGNOSIS — D69.6 THROMBOCYTOPENIA (HCC): ICD-10-CM

## 2019-12-13 DIAGNOSIS — C91.10 CLL (CHRONIC LYMPHOCYTIC LEUKEMIA) (HCC): ICD-10-CM

## 2019-12-13 DIAGNOSIS — I10 BENIGN ESSENTIAL HTN: ICD-10-CM

## 2019-12-13 DIAGNOSIS — N18.30 CHRONIC KIDNEY DISEASE (CKD), STAGE III (MODERATE) (HCC): ICD-10-CM

## 2019-12-13 NOTE — OUTREACH NOTE
Patient Outreach Note    Inbound call from patient, retuning my call.  Discussed at length her balance issues and recent falls.  Fall preventive measures, bleeding precautions reviewed. . Denies headache or residual from recent fall ,  hitting her head.  Only complaint is foot pain and medications include Mobic.  Discussed follow-up labs for 12/18/19 and AWV with Dr. Rojas on 12/24/19.  Uncertain as to location for labs and informed would have Dr. Rojas's office contact her with that  information.  Daughter provides transportation to [facility]. Denies further needs today.  Contact information provided.     Sarai Tillman RN  Ambulatory     12/13/2019, 3:55 PM

## 2019-12-18 LAB
ALBUMIN SERPL-MCNC: 3.9 G/DL (ref 3.5–5.2)
ALBUMIN/GLOB SERPL: 1.6 G/DL
ALP SERPL-CCNC: 49 U/L (ref 39–117)
ALT SERPL-CCNC: 11 U/L (ref 1–33)
AST SERPL-CCNC: 14 U/L (ref 1–32)
BASOPHILS # BLD AUTO: ABNORMAL 10*3/UL
BILIRUB SERPL-MCNC: 0.5 MG/DL (ref 0.2–1.2)
BUN SERPL-MCNC: 19 MG/DL (ref 8–23)
BUN/CREAT SERPL: 22.1 (ref 7–25)
CALCIUM SERPL-MCNC: 9.1 MG/DL (ref 8.2–9.6)
CHLORIDE SERPL-SCNC: 108 MMOL/L (ref 98–107)
CHOLEST SERPL-MCNC: 166 MG/DL (ref 0–200)
CO2 SERPL-SCNC: 25.1 MMOL/L (ref 22–29)
CREAT SERPL-MCNC: 0.86 MG/DL (ref 0.57–1)
DIFFERENTIAL COMMENT: ABNORMAL
EOSINOPHIL # BLD AUTO: ABNORMAL 10*3/UL
EOSINOPHIL NFR BLD AUTO: ABNORMAL %
ERYTHROCYTE [DISTWIDTH] IN BLOOD BY AUTOMATED COUNT: 13.4 % (ref 12.3–15.4)
GLOBULIN SER CALC-MCNC: 2.4 GM/DL
GLUCOSE SERPL-MCNC: 88 MG/DL (ref 65–99)
HBA1C MFR BLD: 6.1 % (ref 4.8–5.6)
HCT VFR BLD AUTO: 39.4 % (ref 34–46.6)
HDLC SERPL-MCNC: 42 MG/DL (ref 40–60)
HGB BLD-MCNC: 12.9 G/DL (ref 12–15.9)
LDLC SERPL CALC-MCNC: 107 MG/DL (ref 0–100)
LYMPHOCYTES # BLD AUTO: ABNORMAL 10*3/UL
LYMPHOCYTES # BLD MANUAL: 5.15 10*3/MM3 (ref 0.7–3.1)
LYMPHOCYTES NFR BLD AUTO: ABNORMAL %
LYMPHOCYTES NFR BLD MANUAL: 52.6 % (ref 19.6–45.3)
MCH RBC QN AUTO: 29.7 PG (ref 26.6–33)
MCHC RBC AUTO-ENTMCNC: 32.7 G/DL (ref 31.5–35.7)
MCV RBC AUTO: 90.8 FL (ref 79–97)
MONOCYTES # BLD MANUAL: 0.51 10*3/MM3 (ref 0.1–0.9)
MONOCYTES NFR BLD AUTO: ABNORMAL %
MONOCYTES NFR BLD MANUAL: 5.2 % (ref 5–12)
NEUTROPHILS # BLD MANUAL: 3.94 10*3/MM3 (ref 1.7–7)
NEUTROPHILS NFR BLD AUTO: ABNORMAL %
NEUTROPHILS NFR BLD MANUAL: 40.2 % (ref 42.7–76)
PLATELET # BLD AUTO: 78 10*3/MM3 (ref 140–450)
PLATELET BLD QL SMEAR: ABNORMAL
POTASSIUM SERPL-SCNC: 4.1 MMOL/L (ref 3.5–5.2)
PROT SERPL-MCNC: 6.3 G/DL (ref 6–8.5)
RBC # BLD AUTO: 4.34 10*6/MM3 (ref 3.77–5.28)
RBC MORPH BLD: ABNORMAL
SODIUM SERPL-SCNC: 144 MMOL/L (ref 136–145)
TRIGL SERPL-MCNC: 84 MG/DL (ref 0–150)
UNABLE TO VOID: NORMAL
VLDLC SERPL CALC-MCNC: 16.8 MG/DL
WBC # BLD AUTO: 9.8 10*3/MM3 (ref 3.4–10.8)

## 2019-12-19 ENCOUNTER — LAB (OUTPATIENT)
Dept: LAB | Facility: HOSPITAL | Age: 84
End: 2019-12-19

## 2019-12-19 ENCOUNTER — CLINICAL SUPPORT (OUTPATIENT)
Dept: ONCOLOGY | Facility: HOSPITAL | Age: 84
End: 2019-12-19

## 2019-12-19 DIAGNOSIS — Z79.01 LONG TERM CURRENT USE OF ANTICOAGULANT: Primary | ICD-10-CM

## 2019-12-19 DIAGNOSIS — C91.10 CLL (CHRONIC LYMPHOCYTIC LEUKEMIA) (HCC): ICD-10-CM

## 2019-12-19 DIAGNOSIS — N18.30 CHRONIC KIDNEY DISEASE (CKD), STAGE III (MODERATE) (HCC): ICD-10-CM

## 2019-12-19 LAB
BASOPHILS # BLD AUTO: 0.01 10*3/MM3 (ref 0–0.2)
BASOPHILS NFR BLD AUTO: 0.1 % (ref 0–1.5)
DEPRECATED RDW RBC AUTO: 50.9 FL (ref 37–54)
EOSINOPHIL # BLD AUTO: 0.01 10*3/MM3 (ref 0–0.4)
EOSINOPHIL NFR BLD AUTO: 0.1 % (ref 0.3–6.2)
ERYTHROCYTE [DISTWIDTH] IN BLOOD BY AUTOMATED COUNT: 14.5 % (ref 12.3–15.4)
HCT VFR BLD AUTO: 41.6 % (ref 34–46.6)
HGB BLD-MCNC: 13.4 G/DL (ref 12–15.9)
IMM GRANULOCYTES # BLD AUTO: 0.03 10*3/MM3 (ref 0–0.05)
IMM GRANULOCYTES NFR BLD AUTO: 0.3 % (ref 0–0.5)
INR PPP: 2 (ref 0.9–1.1)
LYMPHOCYTES # BLD AUTO: 7.71 10*3/MM3 (ref 0.7–3.1)
LYMPHOCYTES NFR BLD AUTO: 77.4 % (ref 19.6–45.3)
MCH RBC QN AUTO: 30.7 PG (ref 26.6–33)
MCHC RBC AUTO-ENTMCNC: 32.2 G/DL (ref 31.5–35.7)
MCV RBC AUTO: 95.2 FL (ref 79–97)
MONOCYTES # BLD AUTO: 0.49 10*3/MM3 (ref 0.1–0.9)
MONOCYTES NFR BLD AUTO: 4.9 % (ref 5–12)
NEUTROPHILS # BLD AUTO: 1.71 10*3/MM3 (ref 1.7–7)
NEUTROPHILS NFR BLD AUTO: 17.2 % (ref 42.7–76)
NRBC BLD AUTO-RTO: 0 /100 WBC (ref 0–0.2)
PLATELET # BLD AUTO: 75 10*3/MM3 (ref 140–450)
PMV BLD AUTO: 11.5 FL (ref 6–12)
PROTHROMBIN TIME: 24.3 SECONDS (ref 11–13.5)
RBC # BLD AUTO: 4.37 10*6/MM3 (ref 3.77–5.28)
WBC NRBC COR # BLD: 9.96 10*3/MM3 (ref 3.4–10.8)

## 2019-12-19 PROCEDURE — 36415 COLL VENOUS BLD VENIPUNCTURE: CPT

## 2019-12-19 PROCEDURE — 85025 COMPLETE CBC W/AUTO DIFF WBC: CPT

## 2019-12-19 PROCEDURE — 85610 PROTHROMBIN TIME: CPT

## 2019-12-19 NOTE — PROGRESS NOTES
Pt here for INR and CBC review. Daughter with pt. Labs stable. INR 2.0. Pt reports  Take 3mg on sun/tues/thur and 2mg all other days. No missed doses or new meds. Pt will continue the same dose. All blood counts stable. Platelets 75, stable for pt. No c/o at this time. Pt will return in February for RN review. Advised pt to call with any concerns.       Lab Results   Component Value Date    WBC 9.96 12/19/2019    HGB 13.4 12/19/2019    HCT 41.6 12/19/2019    MCV 95.2 12/19/2019    PLT 75 (L) 12/19/2019

## 2020-01-01 ENCOUNTER — LAB (OUTPATIENT)
Dept: LAB | Facility: HOSPITAL | Age: 85
End: 2020-01-01

## 2020-01-01 ENCOUNTER — INFUSION (OUTPATIENT)
Dept: ONCOLOGY | Facility: HOSPITAL | Age: 85
End: 2020-01-01

## 2020-01-01 ENCOUNTER — APPOINTMENT (OUTPATIENT)
Dept: ONCOLOGY | Facility: HOSPITAL | Age: 85
End: 2020-01-01

## 2020-01-01 ENCOUNTER — CLINICAL SUPPORT (OUTPATIENT)
Dept: ONCOLOGY | Facility: HOSPITAL | Age: 85
End: 2020-01-01

## 2020-01-01 ENCOUNTER — TELEPHONE (OUTPATIENT)
Dept: ONCOLOGY | Facility: CLINIC | Age: 85
End: 2020-01-01

## 2020-01-01 ENCOUNTER — TELEPHONE (OUTPATIENT)
Dept: ONCOLOGY | Facility: HOSPITAL | Age: 85
End: 2020-01-01

## 2020-01-01 ENCOUNTER — OFFICE VISIT (OUTPATIENT)
Dept: ONCOLOGY | Facility: CLINIC | Age: 85
End: 2020-01-01

## 2020-01-01 ENCOUNTER — APPOINTMENT (OUTPATIENT)
Dept: CARDIOLOGY | Facility: HOSPITAL | Age: 85
End: 2020-01-01

## 2020-01-01 ENCOUNTER — APPOINTMENT (OUTPATIENT)
Dept: LAB | Facility: HOSPITAL | Age: 85
End: 2020-01-01

## 2020-01-01 ENCOUNTER — HOSPITAL ENCOUNTER (EMERGENCY)
Facility: HOSPITAL | Age: 85
Discharge: HOME | End: 2020-12-15
Attending: EMERGENCY MEDICINE | Admitting: EMERGENCY MEDICINE

## 2020-01-01 VITALS
OXYGEN SATURATION: 98 % | HEART RATE: 58 BPM | WEIGHT: 195.2 LBS | TEMPERATURE: 97.4 F | HEIGHT: 65 IN | BODY MASS INDEX: 32.52 KG/M2 | DIASTOLIC BLOOD PRESSURE: 70 MMHG | SYSTOLIC BLOOD PRESSURE: 168 MMHG | RESPIRATION RATE: 18 BRPM

## 2020-01-01 VITALS
RESPIRATION RATE: 14 BRPM | OXYGEN SATURATION: 97 % | HEART RATE: 49 BPM | DIASTOLIC BLOOD PRESSURE: 69 MMHG | HEIGHT: 64 IN | BODY MASS INDEX: 29.37 KG/M2 | SYSTOLIC BLOOD PRESSURE: 165 MMHG | TEMPERATURE: 96.9 F | WEIGHT: 172 LBS

## 2020-01-01 DIAGNOSIS — I89.0 LYMPHEDEMA: ICD-10-CM

## 2020-01-01 DIAGNOSIS — D69.6 THROMBOCYTOPENIA (HCC): ICD-10-CM

## 2020-01-01 DIAGNOSIS — C91.10 CLL (CHRONIC LYMPHOCYTIC LEUKEMIA) (HCC): ICD-10-CM

## 2020-01-01 DIAGNOSIS — N18.30 CHRONIC KIDNEY DISEASE (CKD), STAGE III (MODERATE) (HCC): ICD-10-CM

## 2020-01-01 DIAGNOSIS — I87.2 VENOUS STASIS DERMATITIS OF BOTH LOWER EXTREMITIES: ICD-10-CM

## 2020-01-01 DIAGNOSIS — C91.10 CLL (CHRONIC LYMPHOCYTIC LEUKEMIA) (HCC): Primary | ICD-10-CM

## 2020-01-01 DIAGNOSIS — Z79.01 LONG TERM CURRENT USE OF ANTICOAGULANT: ICD-10-CM

## 2020-01-01 DIAGNOSIS — Z79.01 LONG TERM CURRENT USE OF ANTICOAGULANT: Primary | ICD-10-CM

## 2020-01-01 DIAGNOSIS — R60.9 DEPENDENT EDEMA: Primary | ICD-10-CM

## 2020-01-01 LAB
ALBUMIN SERPL-MCNC: 3.7 G/DL (ref 3.5–5.2)
ALBUMIN SERPL-MCNC: 3.8 G/DL (ref 3.5–5.2)
ALBUMIN/GLOB SERPL: 1.3 G/DL (ref 1.1–2.4)
ALBUMIN/GLOB SERPL: 1.5 G/DL
ALP SERPL-CCNC: 48 U/L (ref 38–116)
ALP SERPL-CCNC: 56 U/L (ref 39–117)
ALT SERPL W P-5'-P-CCNC: 7 U/L (ref 0–33)
ALT SERPL W P-5'-P-CCNC: 9 U/L (ref 1–33)
ANION GAP SERPL CALCULATED.3IONS-SCNC: 11.3 MMOL/L (ref 5–15)
ANION GAP SERPL CALCULATED.3IONS-SCNC: 7.1 MMOL/L (ref 5–15)
AST SERPL-CCNC: 12 U/L (ref 0–32)
AST SERPL-CCNC: 15 U/L (ref 1–32)
BASOPHILS # BLD AUTO: 0.01 10*3/MM3 (ref 0–0.2)
BASOPHILS # BLD AUTO: 0.02 10*3/MM3 (ref 0–0.2)
BASOPHILS # BLD AUTO: 0.02 10*3/MM3 (ref 0–0.2)
BASOPHILS # BLD AUTO: 0.03 10*3/MM3 (ref 0–0.2)
BASOPHILS # BLD AUTO: 0.03 10*3/MM3 (ref 0–0.2)
BASOPHILS NFR BLD AUTO: 0.1 % (ref 0–1.5)
BASOPHILS NFR BLD AUTO: 0.2 % (ref 0–1.5)
BASOPHILS NFR BLD AUTO: 0.2 % (ref 0–1.5)
BASOPHILS NFR BLD AUTO: 0.3 % (ref 0–1.5)
BASOPHILS NFR BLD AUTO: 0.3 % (ref 0–1.5)
BH CV LOWER VASCULAR LEFT COMMON FEMORAL AUGMENT: NORMAL
BH CV LOWER VASCULAR LEFT COMMON FEMORAL COMPETENT: NORMAL
BH CV LOWER VASCULAR LEFT COMMON FEMORAL COMPRESS: NORMAL
BH CV LOWER VASCULAR LEFT COMMON FEMORAL PHASIC: NORMAL
BH CV LOWER VASCULAR LEFT COMMON FEMORAL SPONT: NORMAL
BH CV LOWER VASCULAR LEFT DISTAL FEMORAL COMPRESS: NORMAL
BH CV LOWER VASCULAR LEFT GASTRONEMIUS COMPRESS: NORMAL
BH CV LOWER VASCULAR LEFT GREATER SAPH AK COMPRESS: NORMAL
BH CV LOWER VASCULAR LEFT GREATER SAPH BK COMPRESS: NORMAL
BH CV LOWER VASCULAR LEFT LESSER SAPH COMPRESS: NORMAL
BH CV LOWER VASCULAR LEFT MID FEMORAL AUGMENT: NORMAL
BH CV LOWER VASCULAR LEFT MID FEMORAL COMPETENT: NORMAL
BH CV LOWER VASCULAR LEFT MID FEMORAL COMPRESS: NORMAL
BH CV LOWER VASCULAR LEFT MID FEMORAL PHASIC: NORMAL
BH CV LOWER VASCULAR LEFT MID FEMORAL SPONT: NORMAL
BH CV LOWER VASCULAR LEFT PERONEAL COMPRESS: NORMAL
BH CV LOWER VASCULAR LEFT POPLITEAL AUGMENT: NORMAL
BH CV LOWER VASCULAR LEFT POPLITEAL COMPETENT: NORMAL
BH CV LOWER VASCULAR LEFT POPLITEAL COMPRESS: NORMAL
BH CV LOWER VASCULAR LEFT POPLITEAL PHASIC: NORMAL
BH CV LOWER VASCULAR LEFT POPLITEAL SPONT: NORMAL
BH CV LOWER VASCULAR LEFT POSTERIOR TIBIAL COMPRESS: NORMAL
BH CV LOWER VASCULAR LEFT PROFUNDA FEMORAL COMPRESS: NORMAL
BH CV LOWER VASCULAR LEFT PROXIMAL FEMORAL COMPRESS: NORMAL
BH CV LOWER VASCULAR LEFT SAPHENOFEMORAL JUNCTION COMPRESS: NORMAL
BH CV LOWER VASCULAR RIGHT COMMON FEMORAL AUGMENT: NORMAL
BH CV LOWER VASCULAR RIGHT COMMON FEMORAL COMPETENT: NORMAL
BH CV LOWER VASCULAR RIGHT COMMON FEMORAL COMPRESS: NORMAL
BH CV LOWER VASCULAR RIGHT COMMON FEMORAL PHASIC: NORMAL
BH CV LOWER VASCULAR RIGHT COMMON FEMORAL SPONT: NORMAL
BH CV LOWER VASCULAR RIGHT DISTAL FEMORAL COMPRESS: NORMAL
BH CV LOWER VASCULAR RIGHT GASTRONEMIUS COMPRESS: NORMAL
BH CV LOWER VASCULAR RIGHT GREATER SAPH AK COMPRESS: NORMAL
BH CV LOWER VASCULAR RIGHT GREATER SAPH BK COMPRESS: NORMAL
BH CV LOWER VASCULAR RIGHT LESSER SAPH COMPRESS: NORMAL
BH CV LOWER VASCULAR RIGHT MID FEMORAL AUGMENT: NORMAL
BH CV LOWER VASCULAR RIGHT MID FEMORAL COMPETENT: NORMAL
BH CV LOWER VASCULAR RIGHT MID FEMORAL COMPRESS: NORMAL
BH CV LOWER VASCULAR RIGHT MID FEMORAL PHASIC: NORMAL
BH CV LOWER VASCULAR RIGHT MID FEMORAL SPONT: NORMAL
BH CV LOWER VASCULAR RIGHT PERONEAL COMPRESS: NORMAL
BH CV LOWER VASCULAR RIGHT POPLITEAL AUGMENT: NORMAL
BH CV LOWER VASCULAR RIGHT POPLITEAL COMPETENT: NORMAL
BH CV LOWER VASCULAR RIGHT POPLITEAL COMPRESS: NORMAL
BH CV LOWER VASCULAR RIGHT POPLITEAL PHASIC: NORMAL
BH CV LOWER VASCULAR RIGHT POPLITEAL SPONT: NORMAL
BH CV LOWER VASCULAR RIGHT POPLITEAL THROMBUS: NORMAL
BH CV LOWER VASCULAR RIGHT POSTERIOR TIBIAL COMPRESS: NORMAL
BH CV LOWER VASCULAR RIGHT PROFUNDA FEMORAL COMPRESS: NORMAL
BH CV LOWER VASCULAR RIGHT PROXIMAL FEMORAL COMPRESS: NORMAL
BH CV LOWER VASCULAR RIGHT SAPHENOFEMORAL JUNCTION COMPRESS: NORMAL
BH CV POP FLUID COLLECT LEFT: 1
BILIRUB SERPL-MCNC: 0.4 MG/DL (ref 0.2–1.2)
BILIRUB SERPL-MCNC: 0.4 MG/DL (ref 0–1.2)
BUN SERPL-MCNC: 17 MG/DL (ref 6–20)
BUN SERPL-MCNC: 18 MG/DL (ref 8–23)
BUN/CREAT SERPL: 16.2 (ref 7.3–30)
BUN/CREAT SERPL: 24.3 (ref 7–25)
CALCIUM SPEC-SCNC: 9.2 MG/DL (ref 8.2–9.6)
CALCIUM SPEC-SCNC: 9.2 MG/DL (ref 8.5–10.2)
CHLORIDE SERPL-SCNC: 108 MMOL/L (ref 98–107)
CHLORIDE SERPL-SCNC: 108 MMOL/L (ref 98–107)
CO2 SERPL-SCNC: 23.7 MMOL/L (ref 22–29)
CO2 SERPL-SCNC: 26.9 MMOL/L (ref 22–29)
CREAT SERPL-MCNC: 0.74 MG/DL (ref 0.57–1)
CREAT SERPL-MCNC: 1.05 MG/DL (ref 0.6–1.1)
DEPRECATED RDW RBC AUTO: 43.7 FL (ref 37–54)
DEPRECATED RDW RBC AUTO: 47.4 FL (ref 37–54)
DEPRECATED RDW RBC AUTO: 49.2 FL (ref 37–54)
DEPRECATED RDW RBC AUTO: 49.7 FL (ref 37–54)
DEPRECATED RDW RBC AUTO: 51.5 FL (ref 37–54)
DEPRECATED RDW RBC AUTO: 51.8 FL (ref 37–54)
EOSINOPHIL # BLD AUTO: 0.05 10*3/MM3 (ref 0–0.4)
EOSINOPHIL # BLD AUTO: 0.06 10*3/MM3 (ref 0–0.4)
EOSINOPHIL # BLD AUTO: 0.07 10*3/MM3 (ref 0–0.4)
EOSINOPHIL # BLD AUTO: 0.07 10*3/MM3 (ref 0–0.4)
EOSINOPHIL # BLD AUTO: 0.09 10*3/MM3 (ref 0–0.4)
EOSINOPHIL # BLD MANUAL: 0.17 10*3/MM3 (ref 0–0.4)
EOSINOPHIL NFR BLD AUTO: 0.5 % (ref 0.3–6.2)
EOSINOPHIL NFR BLD AUTO: 0.7 % (ref 0.3–6.2)
EOSINOPHIL NFR BLD AUTO: 0.8 % (ref 0.3–6.2)
EOSINOPHIL NFR BLD MANUAL: 2 % (ref 0.3–6.2)
ERYTHROCYTE [DISTWIDTH] IN BLOOD BY AUTOMATED COUNT: 13.2 % (ref 12.3–15.4)
ERYTHROCYTE [DISTWIDTH] IN BLOOD BY AUTOMATED COUNT: 14 % (ref 12.3–15.4)
ERYTHROCYTE [DISTWIDTH] IN BLOOD BY AUTOMATED COUNT: 14.3 % (ref 12.3–15.4)
ERYTHROCYTE [DISTWIDTH] IN BLOOD BY AUTOMATED COUNT: 14.5 % (ref 12.3–15.4)
ERYTHROCYTE [DISTWIDTH] IN BLOOD BY AUTOMATED COUNT: 14.5 % (ref 12.3–15.4)
ERYTHROCYTE [DISTWIDTH] IN BLOOD BY AUTOMATED COUNT: 15 % (ref 12.3–15.4)
GFR SERPL CREATININE-BSD FRML MDRD: 49 ML/MIN/1.73
GFR SERPL CREATININE-BSD FRML MDRD: 73 ML/MIN/1.73
GLOBULIN UR ELPH-MCNC: 2.6 GM/DL
GLOBULIN UR ELPH-MCNC: 2.8 GM/DL (ref 1.8–3.5)
GLUCOSE SERPL-MCNC: 89 MG/DL (ref 65–99)
GLUCOSE SERPL-MCNC: 98 MG/DL (ref 74–124)
HCT VFR BLD AUTO: 41.4 % (ref 34–46.6)
HCT VFR BLD AUTO: 41.8 % (ref 34–46.6)
HCT VFR BLD AUTO: 42.5 % (ref 34–46.6)
HCT VFR BLD AUTO: 42.6 % (ref 34–46.6)
HCT VFR BLD AUTO: 43.4 % (ref 34–46.6)
HCT VFR BLD AUTO: 45.9 % (ref 34–46.6)
HGB BLD-MCNC: 12.7 G/DL (ref 12–15.9)
HGB BLD-MCNC: 13.2 G/DL (ref 12–15.9)
HGB BLD-MCNC: 13.5 G/DL (ref 12–15.9)
HGB BLD-MCNC: 13.6 G/DL (ref 12–15.9)
HGB BLD-MCNC: 14.1 G/DL (ref 12–15.9)
HGB BLD-MCNC: 14.6 G/DL (ref 12–15.9)
IMM GRANULOCYTES # BLD AUTO: 0.01 10*3/MM3 (ref 0–0.05)
IMM GRANULOCYTES # BLD AUTO: 0.02 10*3/MM3 (ref 0–0.05)
IMM GRANULOCYTES # BLD AUTO: 0.02 10*3/MM3 (ref 0–0.05)
IMM GRANULOCYTES # BLD AUTO: 0.03 10*3/MM3 (ref 0–0.05)
IMM GRANULOCYTES # BLD AUTO: 0.05 10*3/MM3 (ref 0–0.05)
IMM GRANULOCYTES NFR BLD AUTO: 0.1 % (ref 0–0.5)
IMM GRANULOCYTES NFR BLD AUTO: 0.2 % (ref 0–0.5)
IMM GRANULOCYTES NFR BLD AUTO: 0.2 % (ref 0–0.5)
IMM GRANULOCYTES NFR BLD AUTO: 0.3 % (ref 0–0.5)
IMM GRANULOCYTES NFR BLD AUTO: 0.5 % (ref 0–0.5)
INR PPP: 1.2 (ref 0.9–1.1)
INR PPP: 1.6 (ref 0.9–1.1)
INR PPP: 1.7 (ref 0.9–1.1)
INR PPP: 2 (ref 0.9–1.1)
INR PPP: 2.4 (ref 0.9–1.1)
INR PPP: 2.4 (ref 0.9–1.1)
INR PPP: 2.6 (ref 0.9–1.1)
INR PPP: 2.7 (ref 0.9–1.1)
INR PPP: 3 (ref 0.9–1.1)
INR PPP: 3.1 (ref 0.9–1.1)
LYMPHOCYTES # BLD AUTO: 6.73 10*3/MM3 (ref 0.7–3.1)
LYMPHOCYTES # BLD AUTO: 6.89 10*3/MM3 (ref 0.7–3.1)
LYMPHOCYTES # BLD AUTO: 7.01 10*3/MM3 (ref 0.7–3.1)
LYMPHOCYTES # BLD AUTO: 8.04 10*3/MM3 (ref 0.7–3.1)
LYMPHOCYTES # BLD AUTO: 8.23 10*3/MM3 (ref 0.7–3.1)
LYMPHOCYTES # BLD MANUAL: 4.95 10*3/MM3 (ref 0.7–3.1)
LYMPHOCYTES NFR BLD AUTO: 72 % (ref 19.6–45.3)
LYMPHOCYTES NFR BLD AUTO: 72.8 % (ref 19.6–45.3)
LYMPHOCYTES NFR BLD AUTO: 73 % (ref 19.6–45.3)
LYMPHOCYTES NFR BLD AUTO: 74.5 % (ref 19.6–45.3)
LYMPHOCYTES NFR BLD AUTO: 76.1 % (ref 19.6–45.3)
LYMPHOCYTES NFR BLD MANUAL: 57 % (ref 19.6–45.3)
LYMPHOCYTES NFR BLD MANUAL: 7 % (ref 5–12)
MCH RBC QN AUTO: 29.5 PG (ref 26.6–33)
MCH RBC QN AUTO: 29.7 PG (ref 26.6–33)
MCH RBC QN AUTO: 29.8 PG (ref 26.6–33)
MCH RBC QN AUTO: 30 PG (ref 26.6–33)
MCHC RBC AUTO-ENTMCNC: 30.7 G/DL (ref 31.5–35.7)
MCHC RBC AUTO-ENTMCNC: 31.6 G/DL (ref 31.5–35.7)
MCHC RBC AUTO-ENTMCNC: 31.8 G/DL (ref 31.5–35.7)
MCHC RBC AUTO-ENTMCNC: 31.8 G/DL (ref 31.5–35.7)
MCHC RBC AUTO-ENTMCNC: 31.9 G/DL (ref 31.5–35.7)
MCHC RBC AUTO-ENTMCNC: 32.5 G/DL (ref 31.5–35.7)
MCV RBC AUTO: 90.8 FL (ref 79–97)
MCV RBC AUTO: 93 FL (ref 79–97)
MCV RBC AUTO: 93.8 FL (ref 79–97)
MCV RBC AUTO: 93.9 FL (ref 79–97)
MCV RBC AUTO: 94.4 FL (ref 79–97)
MCV RBC AUTO: 97 FL (ref 79–97)
MONOCYTES # BLD AUTO: 0.61 10*3/MM3 (ref 0.1–0.9)
MONOCYTES # BLD AUTO: 0.62 10*3/MM3 (ref 0.1–0.9)
MONOCYTES # BLD AUTO: 0.71 10*3/MM3 (ref 0.1–0.9)
MONOCYTES # BLD AUTO: 0.74 10*3/MM3 (ref 0.1–0.9)
MONOCYTES # BLD AUTO: 0.77 10*3/MM3 (ref 0.1–0.9)
MONOCYTES # BLD AUTO: 0.79 10*3/MM3 (ref 0.1–0.9)
MONOCYTES NFR BLD AUTO: 6.6 % (ref 5–12)
MONOCYTES NFR BLD AUTO: 6.9 % (ref 5–12)
MONOCYTES NFR BLD AUTO: 7.2 % (ref 5–12)
MONOCYTES NFR BLD AUTO: 7.6 % (ref 5–12)
MONOCYTES NFR BLD AUTO: 8.1 % (ref 5–12)
NEUTROPHILS # BLD AUTO: 1.89 10*3/MM3 (ref 1.7–7)
NEUTROPHILS # BLD AUTO: 2.26 10*3/MM3 (ref 1.7–7)
NEUTROPHILS NFR BLD AUTO: 1.57 10*3/MM3 (ref 1.7–7)
NEUTROPHILS NFR BLD AUTO: 1.71 10*3/MM3 (ref 1.7–7)
NEUTROPHILS NFR BLD AUTO: 1.77 10*3/MM3 (ref 1.7–7)
NEUTROPHILS NFR BLD AUTO: 16.3 % (ref 42.7–76)
NEUTROPHILS NFR BLD AUTO: 17.4 % (ref 42.7–76)
NEUTROPHILS NFR BLD AUTO: 18.1 % (ref 42.7–76)
NEUTROPHILS NFR BLD AUTO: 18.2 % (ref 42.7–76)
NEUTROPHILS NFR BLD AUTO: 19.4 % (ref 42.7–76)
NEUTROPHILS NFR BLD AUTO: 2.02 10*3/MM3 (ref 1.7–7)
NEUTROPHILS NFR BLD MANUAL: 26 % (ref 42.7–76)
NRBC BLD AUTO-RTO: 0 /100 WBC (ref 0–0.2)
NT-PROBNP SERPL-MCNC: 951.9 PG/ML (ref 0–1800)
PLAT MORPH BLD: NORMAL
PLATELET # BLD AUTO: 69 10*3/MM3 (ref 140–450)
PLATELET # BLD AUTO: 73 10*3/MM3 (ref 140–450)
PLATELET # BLD AUTO: 80 10*3/MM3 (ref 140–450)
PLATELET # BLD AUTO: 82 10*3/MM3 (ref 140–450)
PLATELET # BLD AUTO: 84 10*3/MM3 (ref 140–450)
PLATELET # BLD AUTO: 85 10*3/MM3 (ref 140–450)
PLATELETS.RETICULATED NFR BLD AUTO: 6.8 % (ref 0.9–6.5)
PMV BLD AUTO: 11 FL (ref 6–12)
PMV BLD AUTO: 11.4 FL (ref 6–12)
PMV BLD AUTO: 11.6 FL (ref 6–12)
PMV BLD AUTO: 11.7 FL (ref 6–12)
PMV BLD AUTO: 11.9 FL (ref 6–12)
PMV BLD AUTO: 12.1 FL (ref 6–12)
POTASSIUM SERPL-SCNC: 4 MMOL/L (ref 3.5–4.7)
POTASSIUM SERPL-SCNC: 4 MMOL/L (ref 3.5–5.2)
PROT SERPL-MCNC: 6.4 G/DL (ref 6–8.5)
PROT SERPL-MCNC: 6.5 G/DL (ref 6.3–8)
PROTHROMBIN TIME: 14.7 SECONDS (ref 11–13.5)
PROTHROMBIN TIME: 19.4 SECONDS (ref 11–13.5)
PROTHROMBIN TIME: 19.7 SECONDS (ref 11.7–14.2)
PROTHROMBIN TIME: 23.4 SECONDS (ref 11–13.5)
PROTHROMBIN TIME: 28.6 SECONDS (ref 11–13.5)
PROTHROMBIN TIME: 29.1 SECONDS (ref 11–13.5)
PROTHROMBIN TIME: 30.8 SECONDS (ref 11–13.5)
PROTHROMBIN TIME: 32.5 SECONDS (ref 11–13.5)
PROTHROMBIN TIME: 36.5 SECONDS (ref 11–13.5)
PROTHROMBIN TIME: 37.1 SECONDS (ref 11–13.5)
QT INTERVAL: 515 MS
RBC # BLD AUTO: 4.27 10*6/MM3 (ref 3.77–5.28)
RBC # BLD AUTO: 4.45 10*6/MM3 (ref 3.77–5.28)
RBC # BLD AUTO: 4.53 10*6/MM3 (ref 3.77–5.28)
RBC # BLD AUTO: 4.58 10*6/MM3 (ref 3.77–5.28)
RBC # BLD AUTO: 4.78 10*6/MM3 (ref 3.77–5.28)
RBC # BLD AUTO: 4.86 10*6/MM3 (ref 3.77–5.28)
RBC MORPH BLD: NORMAL
SODIUM SERPL-SCNC: 142 MMOL/L (ref 136–145)
SODIUM SERPL-SCNC: 143 MMOL/L (ref 134–145)
TROPONIN T SERPL-MCNC: <0.01 NG/ML (ref 0–0.03)
VARIANT LYMPHS NFR BLD MANUAL: 8 % (ref 0–5)
WBC # BLD AUTO: 10.81 10*3/MM3 (ref 3.4–10.8)
WBC # BLD AUTO: 11.02 10*3/MM3 (ref 3.4–10.8)
WBC # BLD AUTO: 8.69 10*3/MM3 (ref 3.4–10.8)
WBC # BLD AUTO: 9.03 10*3/MM3 (ref 3.4–10.8)
WBC # BLD AUTO: 9.47 10*3/MM3 (ref 3.4–10.8)
WBC MORPH BLD: NORMAL
WBC NRBC COR # BLD: 9.74 10*3/MM3 (ref 3.4–10.8)

## 2020-01-01 PROCEDURE — G0463 HOSPITAL OUTPT CLINIC VISIT: HCPCS

## 2020-01-01 PROCEDURE — 85610 PROTHROMBIN TIME: CPT

## 2020-01-01 PROCEDURE — 85007 BL SMEAR W/DIFF WBC COUNT: CPT | Performed by: EMERGENCY MEDICINE

## 2020-01-01 PROCEDURE — 85610 PROTHROMBIN TIME: CPT | Performed by: EMERGENCY MEDICINE

## 2020-01-01 PROCEDURE — 36415 COLL VENOUS BLD VENIPUNCTURE: CPT

## 2020-01-01 PROCEDURE — 85055 RETICULATED PLATELET ASSAY: CPT

## 2020-01-01 PROCEDURE — 84484 ASSAY OF TROPONIN QUANT: CPT | Performed by: EMERGENCY MEDICINE

## 2020-01-01 PROCEDURE — 93005 ELECTROCARDIOGRAM TRACING: CPT | Performed by: EMERGENCY MEDICINE

## 2020-01-01 PROCEDURE — 85025 COMPLETE CBC W/AUTO DIFF WBC: CPT

## 2020-01-01 PROCEDURE — 99283 EMERGENCY DEPT VISIT LOW MDM: CPT

## 2020-01-01 PROCEDURE — 80053 COMPREHEN METABOLIC PANEL: CPT | Performed by: EMERGENCY MEDICINE

## 2020-01-01 PROCEDURE — 99214 OFFICE O/P EST MOD 30 MIN: CPT | Performed by: INTERNAL MEDICINE

## 2020-01-01 PROCEDURE — 80053 COMPREHEN METABOLIC PANEL: CPT

## 2020-01-01 PROCEDURE — 93010 ELECTROCARDIOGRAM REPORT: CPT | Performed by: INTERNAL MEDICINE

## 2020-01-01 PROCEDURE — 83880 ASSAY OF NATRIURETIC PEPTIDE: CPT | Performed by: EMERGENCY MEDICINE

## 2020-01-01 PROCEDURE — 85025 COMPLETE CBC W/AUTO DIFF WBC: CPT | Performed by: EMERGENCY MEDICINE

## 2020-01-01 PROCEDURE — 93970 EXTREMITY STUDY: CPT

## 2020-01-01 PROCEDURE — 96374 THER/PROPH/DIAG INJ IV PUSH: CPT

## 2020-01-01 PROCEDURE — 25010000002 FUROSEMIDE PER 20 MG: Performed by: NURSE PRACTITIONER

## 2020-01-01 RX ORDER — WARFARIN SODIUM 2 MG/1
TABLET ORAL
Qty: 90 TABLET | Refills: 3 | Status: SHIPPED | OUTPATIENT
Start: 2020-01-01 | End: 2021-01-01

## 2020-01-01 RX ORDER — SODIUM CHLORIDE 0.9 % (FLUSH) 0.9 %
10 SYRINGE (ML) INJECTION AS NEEDED
Status: DISCONTINUED | OUTPATIENT
Start: 2020-01-01 | End: 2020-01-01 | Stop reason: HOSPADM

## 2020-01-01 RX ORDER — WARFARIN SODIUM 2 MG/1
TABLET ORAL
Qty: 90 TABLET | Refills: 0 | Status: SHIPPED | OUTPATIENT
Start: 2020-01-01 | End: 2020-01-01 | Stop reason: SDUPTHER

## 2020-01-01 RX ORDER — FUROSEMIDE 20 MG/1
20 TABLET ORAL DAILY
Qty: 3 TABLET | Refills: 0 | Status: SHIPPED | OUTPATIENT
Start: 2020-01-01

## 2020-01-01 RX ORDER — FUROSEMIDE 10 MG/ML
40 INJECTION INTRAMUSCULAR; INTRAVENOUS ONCE
Status: COMPLETED | OUTPATIENT
Start: 2020-01-01 | End: 2020-01-01

## 2020-01-01 RX ORDER — WARFARIN SODIUM 2 MG/1
TABLET ORAL
Qty: 90 TABLET | Refills: 1 | Status: SHIPPED | OUTPATIENT
Start: 2020-01-01 | End: 2020-01-01

## 2020-01-01 RX ADMIN — FUROSEMIDE 40 MG: 10 INJECTION, SOLUTION INTRAMUSCULAR; INTRAVENOUS at 17:55

## 2020-01-03 ENCOUNTER — OFFICE VISIT (OUTPATIENT)
Dept: INTERNAL MEDICINE | Facility: CLINIC | Age: 85
End: 2020-01-03

## 2020-01-03 VITALS
OXYGEN SATURATION: 98 % | TEMPERATURE: 97.7 F | WEIGHT: 183 LBS | RESPIRATION RATE: 12 BRPM | HEART RATE: 58 BPM | SYSTOLIC BLOOD PRESSURE: 128 MMHG | DIASTOLIC BLOOD PRESSURE: 70 MMHG | HEIGHT: 64 IN | BODY MASS INDEX: 31.24 KG/M2

## 2020-01-03 DIAGNOSIS — S81.801A NON-HEALING WOUND OF LOWER EXTREMITY, RIGHT, INITIAL ENCOUNTER: ICD-10-CM

## 2020-01-03 DIAGNOSIS — Z23 ENCOUNTER FOR IMMUNIZATION: ICD-10-CM

## 2020-01-03 DIAGNOSIS — B37.2 CANDIDAL INTERTRIGO: ICD-10-CM

## 2020-01-03 DIAGNOSIS — L57.0 ACTINIC KERATOSIS: ICD-10-CM

## 2020-01-03 DIAGNOSIS — H40.89 OTHER GLAUCOMA OF BOTH EYES: ICD-10-CM

## 2020-01-03 DIAGNOSIS — I89.0 LYMPHEDEMA: ICD-10-CM

## 2020-01-03 DIAGNOSIS — R73.03 PREDIABETES: ICD-10-CM

## 2020-01-03 DIAGNOSIS — I87.2 VENOUS STASIS DERMATITIS OF BOTH LOWER EXTREMITIES: ICD-10-CM

## 2020-01-03 DIAGNOSIS — L98.9 SKIN LESION OF FACE: ICD-10-CM

## 2020-01-03 DIAGNOSIS — Z60.2 LIVES ALONE: ICD-10-CM

## 2020-01-03 DIAGNOSIS — Z91.81 AT HIGH RISK FOR FALLS: ICD-10-CM

## 2020-01-03 DIAGNOSIS — D69.6 THROMBOCYTOPENIA (HCC): ICD-10-CM

## 2020-01-03 DIAGNOSIS — R29.898 MUSCULAR DECONDITIONING: ICD-10-CM

## 2020-01-03 DIAGNOSIS — R26.81 GAIT INSTABILITY: ICD-10-CM

## 2020-01-03 DIAGNOSIS — C91.10 CLL (CHRONIC LYMPHOCYTIC LEUKEMIA) (HCC): ICD-10-CM

## 2020-01-03 DIAGNOSIS — I10 ESSENTIAL HYPERTENSION: ICD-10-CM

## 2020-01-03 DIAGNOSIS — Z85.3 HISTORY OF BREAST CANCER: ICD-10-CM

## 2020-01-03 DIAGNOSIS — Z00.00 HEALTHCARE MAINTENANCE: Primary | ICD-10-CM

## 2020-01-03 DIAGNOSIS — M15.9 PRIMARY OSTEOARTHRITIS INVOLVING MULTIPLE JOINTS: ICD-10-CM

## 2020-01-03 DIAGNOSIS — Z12.31 ENCOUNTER FOR SCREENING MAMMOGRAM FOR MALIGNANT NEOPLASM OF BREAST: ICD-10-CM

## 2020-01-03 DIAGNOSIS — Z79.01 LONG TERM CURRENT USE OF ANTICOAGULANT: ICD-10-CM

## 2020-01-03 PROBLEM — D68.59 THROMBOPHILIA (HCC): Status: RESOLVED | Noted: 2017-02-09 | Resolved: 2020-01-03

## 2020-01-03 PROCEDURE — 90670 PCV13 VACCINE IM: CPT | Performed by: INTERNAL MEDICINE

## 2020-01-03 PROCEDURE — 99214 OFFICE O/P EST MOD 30 MIN: CPT | Performed by: INTERNAL MEDICINE

## 2020-01-03 PROCEDURE — 90653 IIV ADJUVANT VACCINE IM: CPT | Performed by: INTERNAL MEDICINE

## 2020-01-03 PROCEDURE — G0008 ADMIN INFLUENZA VIRUS VAC: HCPCS | Performed by: INTERNAL MEDICINE

## 2020-01-03 PROCEDURE — G0009 ADMIN PNEUMOCOCCAL VACCINE: HCPCS | Performed by: INTERNAL MEDICINE

## 2020-01-03 PROCEDURE — G0439 PPPS, SUBSEQ VISIT: HCPCS | Performed by: INTERNAL MEDICINE

## 2020-01-03 RX ORDER — KETOCONAZOLE 20 MG/G
CREAM TOPICAL DAILY
Qty: 60 G | Refills: 1 | Status: SHIPPED | OUTPATIENT
Start: 2020-01-03

## 2020-01-03 SDOH — SOCIAL STABILITY - SOCIAL INSECURITY: PROBLEMS RELATED TO LIVING ALONE: Z60.2

## 2020-01-03 NOTE — PROGRESS NOTES
"Subjective     No chief complaint on file.      HPI:  Virginia Benavidez is a 89 y.o. female RTC in yearly AWV, review of medical issues: Transfer from Dr. Yip.   1.  CLL - dx'd years ago.  Sees Dr. Palencia.  NO tx, has been monitorgin.   Has been stable.  Her blood counts are stable no indication to initiate therapy at this time  2.  Thrombocytopenia - no bleeding issues in the past. Notes stable in past around 70-90 category.    Her platelet count slightly lower today but remains in a safe range.  3.  Recurrent blood clotting - started after CLL.  Notes that had one distant past in 40's.  Is on chronic Coumadin anticoagulation.     with therapeutic INR today of 2.  4. CKD III - noted in chart. NO nephrologist appt. Urinates well, 'I go to the bathroom a lot\".   5. OA, hands and neck and knees - uses Tylenol daily. Helps with pain.  No joint operations in the past.   6. IFG/ Pre-DM - pt noted that \"I do not have diabetes\".  Eats \"candy\" but otherwise eats a good diet.  Cooks a variety of foods at home, lives alone.  Family does some cooking for her as well.   7. Mobility decline - notes has fall risk and has fallen multiple times in past.  Last fall was on concrete steps in 11/2019. Has wound that is slow to heal on R leg. Falling has developed over time 'since started relying on her walker\".  Notes did see PT a few years ago but did not come home and do the exercises given to her. Is not dizzy.  Has some double vision issues that has been addressed with prisms in glasses. No pre-syncope issues.   8. Breast cancer - 2005, had a lumpectomy. No chemo or rads.    9. Hearing loss - has had hearing issues for a while.  Has not been interested in seeing audiology.     Daughter present and notes that self care at home is issue. Has found mother on floor multiple times and has a hard time lifting her. Mother refuses to move.  Has sister who checks on her in evening.  Has not been willing to use life alert bracelet.       The " "following portions of the patient's history were reviewed and updated as appropriate: allergies, current medications, past family history, past medical history, past social history, past surgical history and problem list.    Review of Systems   Constitution: Negative for chills, fever, night sweats, weight gain and weight loss.   HENT: Positive for hearing loss (long hx, declines aides). Negative for congestion, odynophagia and sore throat.    Eyes: Positive for double vision (sees optho, has prisms). Negative for discharge, pain and redness.        Last eye exam 12/2019; wears glasses  Pressures in eyes are good.    Cardiovascular: Positive for leg swelling (on both legs. ). Negative for chest pain, dyspnea on exertion, irregular heartbeat, near-syncope, palpitations and syncope.   Respiratory: Negative for cough and shortness of breath.    Endocrine: Negative for polydipsia, polyphagia and polyuria.   Hematologic/Lymphatic: Negative for bleeding problem. Does not bruise/bleed easily.   Skin: Positive for poor wound healing (on R leg; for last few months), rash (psoriasis on arms) and suspicious lesions (papule on  R cheek).        Saw derm and did not like them.  Rashes are worse. Has lesion on face that is growing, come in after self resected lesion on her own in past. \"It has already come in again\".      Musculoskeletal: Positive for arthritis, joint pain and neck pain. Negative for joint swelling, muscle cramps, muscle weakness and myalgias.   Gastrointestinal: Negative for constipation, diarrhea (occasional, not persistent), dysphagia, heartburn, nausea and vomiting.   Genitourinary: Positive for frequency. Negative for dysuria and hematuria.   Neurological: Negative for dizziness, headaches and light-headedness.   Psychiatric/Behavioral: Negative for depression. The patient is not nervous/anxious.    Allergic/Immunologic: Negative for environmental allergies and persistent infections.       Patient Care " Team:  Emeka Rojas MD as PCP - General (Internal Medicine)  J Luis Velez MD as Consulting Physician (Hematology and Oncology)  Alexandro Lomeli MD as Referring Physician (Breast Surgery)  Hugo Palencia MD as Consulting Physician (Hematology and Oncology)    Recent Hospitalizations: No recent hospitalization(s).    Depression Screen:   PHQ-2/PHQ-9 Depression Screening 1/3/2020   Little interest or pleasure in doing things 0   Feeling down, depressed, or hopeless 0   Trouble falling or staying asleep, or sleeping too much 0   Feeling tired or having little energy 0   Poor appetite or overeating 0   Feeling bad about yourself - or that you are a failure or have let yourself or your family down 0   Trouble concentrating on things, such as reading the newspaper or watching television 0   Moving or speaking so slowly that other people could have noticed. Or the opposite - being so fidgety or restless that you have been moving around a lot more than usual 0   Thoughts that you would be better off dead, or of hurting yourself in some way 0   Total Score 0   If you checked off any problems, how difficult have these problems made it for you to do your work, take care of things at home, or get along with other people? Not difficult at all       Functional and Cognitive Screening:  Functional & Cognitive Status 1/3/2020   Do you have difficulty preparing food and eating? No   Do you have difficulty bathing yourself, getting dressed or grooming yourself? No   Do you have difficulty using the toilet? No   Do you have difficulty moving around from place to place? Yes   Do you have trouble with steps or getting out of a bed or a chair? No   Current Diet Frequent Junk Food   Dental Exam Not up to date   Eye Exam Up to date   Exercise (times per week) 0 times per week   Current Exercise Activities Include None   Do you need help using the phone?  No   Are you deaf or do you have serious difficulty hearing?  Yes  "  Do you need help with transportation? Yes   Do you need help shopping? No   Do you need help preparing meals?  No   Do you need help with housework?  No   Do you need help with laundry? No   Do you need help taking your medications? No   Do you need help managing money? No   Do you ever drive or ride in a car without wearing a seat belt? No   Have you felt unusual stress, anger or loneliness in the last month? No   Who do you live with? Alone   If you need help, do you have trouble finding someone available to you? No   Have you been bothered in the last four weeks by sexual problems? No   Do you have difficulty concentrating, remembering or making decisions? No       Does the patient have evidence of cognitive impairment? no    Does not need ASA (and currently is not on it)    Vitals:    01/03/20 1335   BP: 128/70   Pulse: 58   Resp: 12   Temp: 97.7 °F (36.5 °C)   SpO2: 98%   Weight: 83 kg (183 lb)   Height: 162.6 cm (64\")   PainSc: 0-No pain       Body mass index is 31.41 kg/m².    Visual Acuity:  No exam data present    Advanced Care Planning:  Patient has an advance directive - a copy has not been provided. Have asked the patient to send this to us to add to record    Objective   Physical Exam   Constitutional: She is oriented to person, place, and time. She appears well-developed and well-nourished. No distress.   HENT:   Head: Normocephalic and atraumatic.   Right Ear: Decreased hearing is noted.   Left Ear: Decreased hearing is noted.   Nose: Nose normal.   Mouth/Throat: Uvula is midline, oropharynx is clear and moist and mucous membranes are normal. Abnormal dentition (missing teeth).   Eyes: Pupils are equal, round, and reactive to light. Conjunctivae, EOM and lids are normal. Right eye exhibits no discharge. Left eye exhibits no discharge. No scleral icterus.   Neck: Trachea normal, normal range of motion and full passive range of motion without pain. Neck supple. Carotid bruit is not present. No thyroid " mass and no thyromegaly present.   Cardiovascular: Normal rate, regular rhythm, S1 normal, S2 normal, normal heart sounds and intact distal pulses. Exam reveals no gallop and no friction rub.   No murmur heard.  Pulses:       Radial pulses are 2+ on the right side, and 2+ on the left side.        Dorsalis pedis pulses are 2+ on the right side, and 2+ on the left side.        Posterior tibial pulses are 2+ on the right side, and 2+ on the left side.   Pulmonary/Chest: Effort normal and breath sounds normal. No respiratory distress. She has no wheezes. She has no rhonchi. She has no rales. She exhibits no mass. Right breast exhibits no inverted nipple, no mass, no nipple discharge and no skin change. Left breast exhibits no inverted nipple, no mass, no nipple discharge and no skin change.   Abdominal: Soft. Normal appearance and bowel sounds are normal. She exhibits no distension and no mass. There is no hepatosplenomegaly. There is no tenderness. There is no rebound and no guarding.   Musculoskeletal: Normal range of motion. She exhibits edema (1+ BLE).        Right hand: She exhibits deformity (DIP joint hypertrophy). She exhibits normal range of motion, no tenderness and no bony tenderness.   Absence of L hand noted with stump noted at wrist.       Vascular Status -  Her right foot exhibits normal foot vasculature  and no edema. Her left foot exhibits normal foot vasculature  and no edema.  Lymphadenopathy:     She has no cervical adenopathy.     She has no axillary adenopathy.        Right: No inguinal adenopathy present.        Left: No inguinal adenopathy present.   Neurological: She is alert and oriented to person, place, and time. She displays no tremor. No cranial nerve deficit or sensory deficit. She exhibits normal muscle tone. Gait (limited mobility, struggles to get from chair to table) abnormal.   Generalized muscle weakness noted, not focal     Skin: Skin is warm and dry. Lesion (AK's noted ovelrying  solar lentiges on B forearms. ) and rash (malodorous shiny erythema under breast and central chest. ) noted.        Psychiatric: She has a normal mood and affect. Her behavior is normal. Cognition and memory are normal.   Vitals reviewed.      Assessment/Plan   Problems Addressed this Visit     Essential hypertension    Gait instability    Relevant Orders    Ambulatory Referral to Home Health    Prediabetes    Primary osteoarthritis involving multiple joints    Long term current use of anticoagulant    Relevant Orders    Ambulatory Referral to Home Health    Venous stasis dermatitis of both lower extremities    Relevant Medications    ketoconazole (NIZORAL) 2 % cream    Other Relevant Orders    Ambulatory Referral to Wound Clinic    CLL (chronic lymphocytic leukemia) (CMS/[facility])    Thrombocytopenia (CMS/[facility])    Other specified glaucoma    Lymphedema    Relevant Orders    Ambulatory Referral to Home Health    Ambulatory Referral to Wound Clinic    Muscular deconditioning    Relevant Orders    Ambulatory Referral to Home Health    At high risk for falls    Relevant Orders    Ambulatory Referral to Home Health      Other Visit Diagnoses     Healthcare maintenance    -  Primary    Relevant Orders    Fluad Tri 65yr+ (Completed)    Pneumococcal Conjugate Vaccine 13-Valent All (Completed)    Mammo Screening Bilateral With CAD    Candidal intertrigo        Relevant Medications    ketoconazole (NIZORAL) 2 % cream    Actinic keratosis        Relevant Medications    ketoconazole (NIZORAL) 2 % cream    Other Relevant Orders    Ambulatory Referral to Dermatology    Skin lesion of face        Relevant Medications    ketoconazole (NIZORAL) 2 % cream    Other Relevant Orders    Ambulatory Referral to Dermatology    Encounter for immunization         Relevant Orders    Fluad Tri 65yr+ (Completed)    Pneumococcal Conjugate Vaccine 13-Valent All (Completed)    Lives alone        Relevant Orders    Ambulatory Referral to Home Health     Non-healing wound of lower extremity, right, initial encounter        Relevant Orders    Ambulatory Referral to Wound Clinic    History of breast cancer        Relevant Orders    Mammo Screening Bilateral With CAD    Encounter for screening mammogram for malignant neoplasm of breast         Relevant Orders    Mammo Screening Bilateral With CAD              Diagnoses and all orders for this visit:    Healthcare maintenance  -     Fluad Tri 65yr+  -     Pneumococcal Conjugate Vaccine 13-Valent All  -     Mammo Screening Bilateral With CAD; Future    Primary osteoarthritis involving multiple joints    Essential hypertension    CLL (chronic lymphocytic leukemia) (CMS/[facility]CC)    Thrombocytopenia (CMS/HCC)    Venous stasis dermatitis of both lower extremities  -     Ambulatory Referral to Wound Clinic    Prediabetes    Other glaucoma of both eyes    Long term current use of anticoagulant  -     Ambulatory Referral to Home Health    Gait instability  -     Ambulatory Referral to Home Health    Lymphedema  -     Ambulatory Referral to Home Health  -     Ambulatory Referral to Wound Clinic    At high risk for falls  -     Ambulatory Referral to Home Health    Muscular deconditioning  -     Ambulatory Referral to Home Health    Candidal intertrigo  -     ketoconazole (NIZORAL) 2 % cream; Apply  topically to the appropriate area as directed Daily. Under breasts    Actinic keratosis  -     Ambulatory Referral to Dermatology    Skin lesion of face  -     Ambulatory Referral to Dermatology    Encounter for immunization   -     Fluad Tri 65yr+  -     Pneumococcal Conjugate Vaccine 13-Valent All    Lives alone  -     Ambulatory Referral to Home Health    Non-healing wound of lower extremity, right, initial encounter  -     Ambulatory Referral to Wound Clinic    History of breast cancer  -     Mammo Screening Bilateral With CAD; Future    Encounter for screening mammogram for malignant neoplasm of breast   -     Mammo Screening  Bilateral With CAD; Future        Virginia Benavidez is a 89 y.o. female RTC in yearly AWV, review of medical issues: Transfer from Dr. Yip. New pt to me today.  1.  CLL - dx'd years ago. Blood counts stable on labs. Reviewed 10/2019 note and no plan to start tx.    2.  Thrombocytopenia - stable on labs, long term issue.  3.  Recurrent blood clotting - unclear when issue started, but on Coumadin per Heme notes. I have real concerns today after d/w pt and daughter about her use of Coumadin as she would appear to be a very poor anti-coag candidate at this time with multiple falls at home.  Will communicate with Heme regarding issue as this pt new to me today.   4. ? Hx of KD III - noted in chart. Cr WNL on labs, need to check U/A.   5. OA, hands and neck and knees - manages with Tylenol daily. Voltaren gel noted to be on med list.   6. IFG/ Pre-DM - A1C progressive on labs today. D/W need to modify candy and sweet intake.  Goal of weight stability at this time. Will trend.  7. Mobility decline, muscular deconditioning, falling - major issue today and curiously pt and daughter note have not had this discussion in past. I had very long talk with pt about her living alone, falls, inability to get up when she falls, inability to self care (candidal intertrigo under breasts as example), and danger of morbidity with falls (especially on coumadin).  I was very straightforward with pt and noted that she MUST wear lifealert necklace, she MUST work with home PT for gait strengthening, and she MUST work with OT at home.  Referral to home health placed and includes home safety eval.  Will assess progress in 3 months.  8. Non-healing traumatic leg wound on R overlying chronic venous stasis and lymphedema - will have wound care eval and will likely need wraps with lymphedema clinic with transition to zip up compression.  Referral placed.   9. AK's and papular melanotic lesion R cheek - needs derm eval, ??insurance denied last visit  as cosmetic. Will place new referral today. Needs bx on R cheek lesion.  10. Breast cancer - 2005, s/p lumpectomy. Breast exam OK. Mammo ordered.   11. Hearing loss - declines audiology.   12.  HM - labs d/w pt; Flu/ Prevnar - today; Pvax - UTD; Shingrix discussed; C-scope D/C; Mammo ordered; DEXA discuss at f/u.      RTC 3 months, F labs prior (CMP, A1C, CBC, U/A)    Addendum: Heme reviewed forwarded note and suggested too that pt not a good coumadin candidate.  I called and d/w daughter (sister of daughter in office at visit) who was very resistant to taking mother off coumadin due to past blood clots.  Denied pt as debilitated as she was in office but did not deny that pt is falling often at home.  I suggested family make appt with Hematology, who they know better than me having just met me once, and discuss with them the risk v. Benefit of coumadin at this point.     Return in about 3 months (around 4/3/2020) for Recheck.          Current Outpatient Medications:   •  diclofenac (VOLTAREN) 1 % gel gel, Apply 4 grams to lower extremeties bid as needed, Disp: 100 g, Rfl: 1  •  dorzolamide (TRUSOPT) 2 % ophthalmic solution, 1 drop 3 (three) times a day., Disp: , Rfl:   •  lisinopril (PRINIVIL,ZESTRIL) 40 MG tablet, Take 40 mg by mouth Daily., Disp: , Rfl:   •  timolol (TIMOPTIC) 0.25 % ophthalmic solution, 1 drop 2 (two) times a day., Disp: , Rfl:   •  warfarin (COUMADIN) 2 MG tablet, Take 3 mg on Sunday Tuesday and Thursday and 2 mg the remaining days unless prescribed by physician, Disp: 90 tablet, Rfl: 1  •  ketoconazole (NIZORAL) 2 % cream, Apply  topically to the appropriate area as directed Daily. Under breasts, Disp: 60 g, Rfl: 1

## 2020-01-03 NOTE — PATIENT INSTRUCTIONS
Shingrix (new shingles shot; 2 shot series) check at pharmacy      Medicare Wellness  Personal Prevention Plan of Service     Date of Office Visit:  2020  Encounter Provider:  Emeka Rojas MD  Place of Service:  [facility] INTERNAL MEDIC[facility]  Patient Name: Virginia Benavidze  :  1925    As part of the Medicare Wellness portion of your visit today, we are providing you with this personalized preventive plan of services (PPPS). This plan is based upon recommendations of the [place] Preventive Services Task Force (USPSTF) and the Advisory Committee on Immunization Practices (ACIP).    This lists the preventive care services that should be considered, and provides dates of when you are due. Items listed as completed are up-to-date and do not require any further intervention.    Health Maintenance   Topic Date Due   • ZOSTER VACCINE (2 of 2) 10/05/2016   • MAMMOGRAM  2020   • LIPID PANEL  2020   • MEDICARE ANNUAL WELLNESS  2021   • TDAP/TD VACCINES (2 - Td) 08/10/2026   • PNEUMOCOCCAL VACCINE (65+ HIGH RISK)  Completed   • INFLUENZA VACCINE  Completed       Orders Placed This Encounter   Procedures   • Mammo Screening Bilateral With CAD     Standing Status:   Future     Standing Expiration Date:   1/3/2021     Order Specific Question:   Reason for Exam:     Answer:   Screening   • Fluad Tri 65yr+   • Pneumococcal Conjugate Vaccine 13-Valent All   • Ambulatory Referral to Dermatology     Referral Priority:   Routine     Referral Type:   Consultation     Referral Reason:   Specialty Services Required     Requested Specialty:   Dermatology     Number of Visits Requested:   1   • Ambulatory Referral to Home Health     Referral Priority:   Routine     Referral Type:   Home Health     Referral Reason:   Specialty Services Required     Requested Specialty:   Home Health Services     Number of Visits Requested:   1   • Ambulatory Referral to Wound Clinic     Referral  Priority:   Routine     Referral Type:   Consultation     Referral Reason:   Specialty Services Required     Number of Visits Requested:   1       Return in about 3 months (around 4/3/2020) for Recheck.

## 2020-01-14 ENCOUNTER — TELEPHONE (OUTPATIENT)
Dept: INTERNAL MEDICINE | Facility: CLINIC | Age: 85
End: 2020-01-14

## 2020-01-14 NOTE — TELEPHONE ENCOUNTER
Tiffanie with [facility] called for verbal orders for OT for 2x a week for 2 weeks and 1x a week for 1 week. Verbal order given.

## 2020-01-22 ENCOUNTER — OFFICE VISIT (OUTPATIENT)
Dept: WOUND CARE | Facility: HOSPITAL | Age: 85
End: 2020-01-22

## 2020-01-22 PROCEDURE — G0463 HOSPITAL OUTPT CLINIC VISIT: HCPCS

## 2020-02-07 ENCOUNTER — RESULTS ENCOUNTER (OUTPATIENT)
Dept: ONCOLOGY | Facility: CLINIC | Age: 85
End: 2020-02-07

## 2020-02-07 DIAGNOSIS — N18.30 CHRONIC KIDNEY DISEASE (CKD), STAGE III (MODERATE) (HCC): ICD-10-CM

## 2020-02-07 DIAGNOSIS — C91.10 CLL (CHRONIC LYMPHOCYTIC LEUKEMIA) (HCC): ICD-10-CM

## 2020-02-07 DIAGNOSIS — Z79.01 LONG TERM CURRENT USE OF ANTICOAGULANT: ICD-10-CM

## 2020-02-11 ENCOUNTER — HOSPITAL ENCOUNTER (OUTPATIENT)
Dept: MAMMOGRAPHY | Facility: HOSPITAL | Age: 85
Discharge: HOME | End: 2020-02-11
Admitting: INTERNAL MEDICINE

## 2020-02-11 DIAGNOSIS — Z12.31 VISIT FOR SCREENING MAMMOGRAM: ICD-10-CM

## 2020-02-11 PROCEDURE — 77063 BREAST TOMOSYNTHESIS BI: CPT

## 2020-02-11 PROCEDURE — 77067 SCR MAMMO BI INCL CAD: CPT

## 2020-02-13 ENCOUNTER — LAB (OUTPATIENT)
Dept: LAB | Facility: HOSPITAL | Age: 85
End: 2020-02-13

## 2020-02-13 ENCOUNTER — CLINICAL SUPPORT (OUTPATIENT)
Dept: ONCOLOGY | Facility: HOSPITAL | Age: 85
End: 2020-02-13

## 2020-02-13 DIAGNOSIS — Z79.01 LONG TERM CURRENT USE OF ANTICOAGULANT: Primary | ICD-10-CM

## 2020-02-13 DIAGNOSIS — N18.30 CHRONIC KIDNEY DISEASE (CKD), STAGE III (MODERATE) (HCC): ICD-10-CM

## 2020-02-13 DIAGNOSIS — C91.10 CLL (CHRONIC LYMPHOCYTIC LEUKEMIA) (HCC): ICD-10-CM

## 2020-02-13 LAB
BASOPHILS # BLD AUTO: 0.02 10*3/MM3 (ref 0–0.2)
BASOPHILS NFR BLD AUTO: 0.2 % (ref 0–1.5)
DEPRECATED RDW RBC AUTO: 50.9 FL (ref 37–54)
EOSINOPHIL # BLD AUTO: 0.05 10*3/MM3 (ref 0–0.4)
EOSINOPHIL NFR BLD AUTO: 0.6 % (ref 0.3–6.2)
ERYTHROCYTE [DISTWIDTH] IN BLOOD BY AUTOMATED COUNT: 14.6 % (ref 12.3–15.4)
HCT VFR BLD AUTO: 41.3 % (ref 34–46.6)
HGB BLD-MCNC: 12.9 G/DL (ref 12–15.9)
IMM GRANULOCYTES # BLD AUTO: 0.02 10*3/MM3 (ref 0–0.05)
IMM GRANULOCYTES NFR BLD AUTO: 0.2 % (ref 0–0.5)
INR PPP: 1.4 (ref 0.9–1.1)
LYMPHOCYTES # BLD AUTO: 6.53 10*3/MM3 (ref 0.7–3.1)
LYMPHOCYTES NFR BLD AUTO: 77 % (ref 19.6–45.3)
MCH RBC QN AUTO: 29.9 PG (ref 26.6–33)
MCHC RBC AUTO-ENTMCNC: 31.2 G/DL (ref 31.5–35.7)
MCV RBC AUTO: 95.6 FL (ref 79–97)
MONOCYTES # BLD AUTO: 0.5 10*3/MM3 (ref 0.1–0.9)
MONOCYTES NFR BLD AUTO: 5.9 % (ref 5–12)
NEUTROPHILS # BLD AUTO: 1.36 10*3/MM3 (ref 1.7–7)
NEUTROPHILS NFR BLD AUTO: 16.1 % (ref 42.7–76)
NRBC BLD AUTO-RTO: 0 /100 WBC (ref 0–0.2)
PLATELET # BLD AUTO: 60 10*3/MM3 (ref 140–450)
PMV BLD AUTO: 12.3 FL (ref 6–12)
PROTHROMBIN TIME: 17 SECONDS (ref 11–13.5)
RBC # BLD AUTO: 4.32 10*6/MM3 (ref 3.77–5.28)
WBC NRBC COR # BLD: 8.48 10*3/MM3 (ref 3.4–10.8)

## 2020-02-13 PROCEDURE — G0463 HOSPITAL OUTPT CLINIC VISIT: HCPCS

## 2020-02-13 PROCEDURE — 85025 COMPLETE CBC W/AUTO DIFF WBC: CPT

## 2020-02-13 PROCEDURE — 85610 PROTHROMBIN TIME: CPT

## 2020-02-13 PROCEDURE — 36415 COLL VENOUS BLD VENIPUNCTURE: CPT

## 2020-02-13 NOTE — PROGRESS NOTES
INR 1.4. Prior dose confirmed. Pt denies any new meds or diet changes. She missed her 3 mg dose of Coumadin on Tues. Platelets dropped to 60K. Pt daughter brought up her know venous statis dermitis. She states it looks better. She still has some pain at times but nothing is new or increased. Reviewed S/S of venous and arterial DVT and PE. Pt knows when to seek medical attention for all.  Pt denies any bleeding  D/W Khushi Sheikh NP. Per Khushi pt is to continue current dose and return Monday for a CBC and PT/INR with review.   Copy of labs given to pt and f/u appt reviewed. Pt is instructed to call the office with any concerns or new symptoms prior to next visit. Marcial munguia

## 2020-02-17 ENCOUNTER — INFUSION (OUTPATIENT)
Dept: ONCOLOGY | Facility: HOSPITAL | Age: 85
End: 2020-02-17

## 2020-02-17 ENCOUNTER — LAB (OUTPATIENT)
Dept: LAB | Facility: HOSPITAL | Age: 85
End: 2020-02-17

## 2020-02-17 VITALS
SYSTOLIC BLOOD PRESSURE: 180 MMHG | HEART RATE: 60 BPM | TEMPERATURE: 98.3 F | OXYGEN SATURATION: 96 % | DIASTOLIC BLOOD PRESSURE: 65 MMHG

## 2020-02-17 DIAGNOSIS — C91.10 CLL (CHRONIC LYMPHOCYTIC LEUKEMIA) (HCC): ICD-10-CM

## 2020-02-17 DIAGNOSIS — Z79.01 LONG TERM CURRENT USE OF ANTICOAGULANT: Primary | ICD-10-CM

## 2020-02-17 DIAGNOSIS — N18.30 CHRONIC KIDNEY DISEASE (CKD), STAGE III (MODERATE) (HCC): ICD-10-CM

## 2020-02-17 LAB
BASOPHILS # BLD AUTO: 0.03 10*3/MM3 (ref 0–0.2)
BASOPHILS NFR BLD AUTO: 0.3 % (ref 0–1.5)
DEPRECATED RDW RBC AUTO: 51.1 FL (ref 37–54)
EOSINOPHIL # BLD AUTO: 0.08 10*3/MM3 (ref 0–0.4)
EOSINOPHIL NFR BLD AUTO: 0.8 % (ref 0.3–6.2)
ERYTHROCYTE [DISTWIDTH] IN BLOOD BY AUTOMATED COUNT: 14.7 % (ref 12.3–15.4)
HCT VFR BLD AUTO: 40.9 % (ref 34–46.6)
HGB BLD-MCNC: 12.7 G/DL (ref 12–15.9)
IMM GRANULOCYTES # BLD AUTO: 0.03 10*3/MM3 (ref 0–0.05)
IMM GRANULOCYTES NFR BLD AUTO: 0.3 % (ref 0–0.5)
INR PPP: 1.3 (ref 0.9–1.1)
LYMPHOCYTES # BLD AUTO: 7.78 10*3/MM3 (ref 0.7–3.1)
LYMPHOCYTES NFR BLD AUTO: 76.6 % (ref 19.6–45.3)
MCH RBC QN AUTO: 29.7 PG (ref 26.6–33)
MCHC RBC AUTO-ENTMCNC: 31.1 G/DL (ref 31.5–35.7)
MCV RBC AUTO: 95.8 FL (ref 79–97)
MONOCYTES # BLD AUTO: 0.67 10*3/MM3 (ref 0.1–0.9)
MONOCYTES NFR BLD AUTO: 6.6 % (ref 5–12)
NEUTROPHILS # BLD AUTO: 1.57 10*3/MM3 (ref 1.7–7)
NEUTROPHILS NFR BLD AUTO: 15.4 % (ref 42.7–76)
NRBC BLD AUTO-RTO: 0 /100 WBC (ref 0–0.2)
PLATELET # BLD AUTO: 75 10*3/MM3 (ref 140–450)
PMV BLD AUTO: 12 FL (ref 6–12)
PROTHROMBIN TIME: 16 SECONDS (ref 11–13.5)
RBC # BLD AUTO: 4.27 10*6/MM3 (ref 3.77–5.28)
WBC NRBC COR # BLD: 10.16 10*3/MM3 (ref 3.4–10.8)

## 2020-02-17 PROCEDURE — 36415 COLL VENOUS BLD VENIPUNCTURE: CPT

## 2020-02-17 PROCEDURE — 85610 PROTHROMBIN TIME: CPT

## 2020-02-17 PROCEDURE — G0463 HOSPITAL OUTPT CLINIC VISIT: HCPCS

## 2020-02-17 PROCEDURE — 85025 COMPLETE CBC W/AUTO DIFF WBC: CPT

## 2020-02-17 NOTE — PROGRESS NOTES
INR 1.3 today. Pt states on Thursday night she laid out her pills, took a sip of water and went to bed. When she woke up, she realized her forgot to take her medication the night before. So she missed her Thursday night dose along with the Tuesday dose as reported last week.     Per [facility] pt is to take 1mg on Sunday and 2mg all other days, reviewed with anson HUTCHINSON, have pt take 2mg every day. Repeat INR next week. Pt v/u and escorted to scheduling.

## 2020-02-24 ENCOUNTER — LAB (OUTPATIENT)
Dept: LAB | Facility: HOSPITAL | Age: 85
End: 2020-02-24

## 2020-02-24 ENCOUNTER — CLINICAL SUPPORT (OUTPATIENT)
Dept: ONCOLOGY | Facility: HOSPITAL | Age: 85
End: 2020-02-24

## 2020-02-24 DIAGNOSIS — Z79.01 LONG TERM CURRENT USE OF ANTICOAGULANT: ICD-10-CM

## 2020-02-24 LAB
INR PPP: 1.4 (ref 0.9–1.1)
PROTHROMBIN TIME: 16.6 SECONDS (ref 11–13.5)

## 2020-02-24 PROCEDURE — G0463 HOSPITAL OUTPT CLINIC VISIT: HCPCS

## 2020-02-24 PROCEDURE — 85610 PROTHROMBIN TIME: CPT

## 2020-02-24 NOTE — PROGRESS NOTES
Pt here for pt/inr today.  INR is 1.4 today.  This has not really improved much since her last visit.    Pt denies missing any doses, no change in diet, no new medications.  Denies any c/o.  She confirms her current dose of coumadin being 2mg daily.     [facility] is down and unable to enter information for inr and coumadin dose.  Reviewed all of the above with Amberly Mandujano NP.  Per Amberly, have patient take 3mg coumadin on Mon/Fri and 2mg all other days.  Pt to return to office on Monday 3/2/20 for pt/inr, cbc, and RN review.  R&V ARIANE Faust    Explained this to patient and pt caregiver. They v/u and repeated information back. I wrote down coumadin dose instructions for patient and escorted them to scheduling to make appt. Instructed patient to call with any issues, questions, or concerns.    Lab Results   Component Value Date    INR 1.40 (H) 02/24/2020    INR 1.30 (H) 02/17/2020    INR 1.40 (H) 02/13/2020    PROTIME 16.6 (H) 02/24/2020    PROTIME 16.0 (H) 02/17/2020    PROTIME 17.0 (H) 02/13/2020

## 2020-03-02 ENCOUNTER — CLINICAL SUPPORT (OUTPATIENT)
Dept: ONCOLOGY | Facility: HOSPITAL | Age: 85
End: 2020-03-02

## 2020-03-02 ENCOUNTER — LAB (OUTPATIENT)
Dept: LAB | Facility: HOSPITAL | Age: 85
End: 2020-03-02

## 2020-03-02 DIAGNOSIS — Z79.01 LONG TERM CURRENT USE OF ANTICOAGULANT: Primary | ICD-10-CM

## 2020-03-02 LAB
INR PPP: 1.4 (ref 0.9–1.1)
PROTHROMBIN TIME: 17 SECONDS (ref 11–13.5)

## 2020-03-02 PROCEDURE — 85610 PROTHROMBIN TIME: CPT

## 2020-03-02 PROCEDURE — G0463 HOSPITAL OUTPT CLINIC VISIT: HCPCS

## 2020-03-09 ENCOUNTER — CLINICAL SUPPORT (OUTPATIENT)
Dept: ONCOLOGY | Facility: HOSPITAL | Age: 85
End: 2020-03-09

## 2020-03-09 ENCOUNTER — LAB (OUTPATIENT)
Dept: LAB | Facility: HOSPITAL | Age: 85
End: 2020-03-09

## 2020-03-09 DIAGNOSIS — Z79.01 LONG TERM CURRENT USE OF ANTICOAGULANT: Primary | ICD-10-CM

## 2020-03-09 LAB
INR PPP: 1.5 (ref 0.9–1.1)
PROTHROMBIN TIME: 18.3 SECONDS (ref 11–13.5)

## 2020-03-09 PROCEDURE — 85610 PROTHROMBIN TIME: CPT

## 2020-03-09 PROCEDURE — G0463 HOSPITAL OUTPT CLINIC VISIT: HCPCS

## 2020-03-09 NOTE — PROGRESS NOTES
INR 1.5 today.  Range for her is 2-2.5. No missed doses and she is taking per SS instructions.  Adjusted dose today per SS and asked pt to return in 1 week for recheck.  Pt and family member v/u.  She will call with any questions or concerns.

## 2020-03-16 ENCOUNTER — CLINICAL SUPPORT (OUTPATIENT)
Dept: ONCOLOGY | Facility: HOSPITAL | Age: 85
End: 2020-03-16

## 2020-03-16 ENCOUNTER — LAB (OUTPATIENT)
Dept: LAB | Facility: HOSPITAL | Age: 85
End: 2020-03-16

## 2020-03-16 DIAGNOSIS — Z79.01 LONG TERM CURRENT USE OF ANTICOAGULANT: Primary | ICD-10-CM

## 2020-03-16 LAB
INR PPP: 3 (ref 0.9–1.1)
PROTHROMBIN TIME: 36.2 SECONDS (ref 11–13.5)

## 2020-03-16 PROCEDURE — G0463 HOSPITAL OUTPT CLINIC VISIT: HCPCS

## 2020-03-16 PROCEDURE — 85610 PROTHROMBIN TIME: CPT

## 2020-03-16 NOTE — NURSING NOTE
Pt presents for INR review w/ INR 3.0 today. Goal range is 2-2.5. Pt denies missed doses, states she didn't take any extra doses, no new medications, dietary changes or bleeding concerns. Per [facility], pt will decrease Coumadin to 2 mg Sun and 3 mg all other days. She will return in 1 week for recheck. Copy of dosing instructions given and pt directed to appt desk. She and daughter v/u.

## 2020-03-23 ENCOUNTER — CLINICAL SUPPORT (OUTPATIENT)
Dept: ONCOLOGY | Facility: HOSPITAL | Age: 85
End: 2020-03-23

## 2020-03-23 ENCOUNTER — LAB (OUTPATIENT)
Dept: LAB | Facility: HOSPITAL | Age: 85
End: 2020-03-23

## 2020-03-23 DIAGNOSIS — Z79.01 LONG TERM CURRENT USE OF ANTICOAGULANT: Primary | ICD-10-CM

## 2020-03-23 LAB
INR PPP: 3.3 (ref 0.9–1.1)
PROTHROMBIN TIME: 39.7 SECONDS (ref 11–13.5)

## 2020-03-23 PROCEDURE — 85610 PROTHROMBIN TIME: CPT

## 2020-03-23 PROCEDURE — G0463 HOSPITAL OUTPT CLINIC VISIT: HCPCS

## 2020-03-23 NOTE — NURSING NOTE
Pt presents for INR review w/ INR of 3.3 today. Pt's goal is 2.0 - 2.5. Pt has been taking 2 mg on Sunday and 3 mg all other days. She denies any extra or missed doses, new medications or supplements, dietary changes or bleeding concerns. INR reviewed w/ Dr. Palencia, pt to take 2 mg coumadin daily and return in 2 weeks for repeat INR. Reviewed above w/ pt's daughter, she and pt v/u. Message sent to scheduling.

## 2020-03-25 ENCOUNTER — TELEPHONE (OUTPATIENT)
Dept: ONCOLOGY | Facility: CLINIC | Age: 85
End: 2020-03-25

## 2020-03-25 NOTE — TELEPHONE ENCOUNTER
Daughter wants to cancel appt on the 6th for lab RN.  Will keep appt with MD on the 13th for now.  Daughter understands to watch for s/s of bleeding or DVT/PE.  Will call for issues.  Message sent to appt desk to cancel.

## 2020-04-01 ENCOUNTER — TELEPHONE (OUTPATIENT)
Dept: ONCOLOGY | Facility: CLINIC | Age: 85
End: 2020-04-01

## 2020-04-03 ENCOUNTER — RESULTS ENCOUNTER (OUTPATIENT)
Dept: ONCOLOGY | Facility: CLINIC | Age: 85
End: 2020-04-03

## 2020-04-03 DIAGNOSIS — C91.10 CLL (CHRONIC LYMPHOCYTIC LEUKEMIA) (HCC): ICD-10-CM

## 2020-04-03 DIAGNOSIS — N18.30 CHRONIC KIDNEY DISEASE (CKD), STAGE III (MODERATE) (HCC): ICD-10-CM

## 2020-04-03 DIAGNOSIS — Z79.01 LONG TERM CURRENT USE OF ANTICOAGULANT: ICD-10-CM

## 2020-04-07 NOTE — TELEPHONE ENCOUNTER
Pt daughter, Sarah, calling in regards to appt scheduled for 4/13/20.    Sarah wants to speak with Dr. Palencia on what to do for this appt. Sarah is scared to bring pt in due to COVID - 19.    Call Sarah back at [phone]

## 2020-04-08 NOTE — TELEPHONE ENCOUNTER
Per Dr Palencia he sent a message to [facility] to change pts appt. [facility] will call pt/daughter and inform them of this.

## 2020-04-27 NOTE — TELEPHONE ENCOUNTER
Pt needs refill on medication  Warfarin 2 mg 1 x a day   Send to [facility]   [phone]  Pt has enough for 2 days   Pt ph [phone]

## 2020-05-12 NOTE — PROGRESS NOTES
INR 1.2 today. Pt confirmed previous dosing. She denies missed doses or new medications. Reviewed with Khushi Sheikh NP. Per Khushi, have pt take 3mg Sun, Tues, Thurs and 2mg all other days. Pt to return in 1 week for recheck. Also, pt needs CBC done today as ordered. Pt informed and given printout of dosing instructions. Pt sent back to lab for CBC. Message sent to scheduling to make apt.     CBC reviewed, counts stable. Attempted to call pt with results, no answer. LVM informing her of this. Advised her to call with any questions or concerns.     Lab Results   Component Value Date    WBC 9.74 05/12/2020    HGB 13.5 05/12/2020    HCT 42.5 05/12/2020    MCV 93.8 05/12/2020    PLT 80 (L) 05/12/2020

## 2020-05-19 NOTE — NURSING NOTE
Pt here for INR review.  Lab today of INR 1.6, PT 19.4.  Confirmed with pt current dosing.  Pt states she currently takes 3mg Warfarin daily, this was recorded in [facility].  Pt confirms no new medications, diet changes or any signs of bleeding. Pt dosing changed to 4mg Sun, Tues and Thurs. And 3 mg warfarin ever other day of the week.  Pt given printed copy of new dosing and pt v/u.  Pt encouraged to call with any issues of concern.

## 2020-05-21 NOTE — TELEPHONE ENCOUNTER
PT'S DAUGHTER NEEDS TO RESCHEDULE THE PT'S APPT ON 6/9/20 FOR LAB / RN REVIEW SHE WOULD LIKE TO RESCHEDULE FOR 6/10 , 6/15 , 6/16 OR 6/19 IN THE MORNING     [place] CONTACT # [phone]

## 2020-06-10 NOTE — PROGRESS NOTES
Pt here for INR check. Pt and daughter report pt taking 4mg Sun, Tue, Thur 3mg all other days. No missed doses or new medications. Per [facility], take 4mg on Sunday and 3mg all other days. Pt and daughter agree. Next appt is scheduled with MD in July. I asked pt to come back in 2 weeks so we can assess this dose change. Pt agrees. New appt for 6/24.

## 2020-06-24 NOTE — NURSING NOTE
Pt states she needs another script for warfarin. Our records show that she should have another refill on the warfarin that was escribed 4/27/20.  I called her pharmacy and s/w Brittani and she verified that refill was still available and she is going to fill that script today.  Pt and daughter in room while I called and they v/u.  Verified next appt which is with Dr. Palencia.  Copy of [facility] protocol given.

## 2020-07-06 NOTE — PROGRESS NOTES
Subjective .     REASONS FOR FOLLOWUP:    1. Abnormal mammogram 6/09.    2. Remote history of breast cancer, status post lumpectomy and radiation therapy (right).    3. History of CLL, stage 0.    4. Thrombophilia.   5. Thrombocytopenia with platelet count around 70,000 - 80,000    History of Present Illness     Mrs Benavidez returns today feeling well, overall.  Her INR today is within the therapeutic range at 2.7.  She is currently on 22 mg of Coumadin weekly.  She denies any excess bleeding or bruising.  No chest pain, shortness of breath, or worsening lower extremity edema.  Patient does have some age-related skin changes as well as some areas of psoriatic flare.  This has been a chronic issue.    In regards to her CLL, she denies any unintentional weight loss, drenching night sweats, or new adenopathies or nodules.  Her total white blood cell count today is normal at 9.47.  Her hemoglobin is also within normal limits at 12.7.  She has chronic thrombocytopenia with a platelet count today of 85,000 again without any bleeding issues.      We had a discussion today regarding the option of Eliquis therapy.  We discussed the pros and cons at length with the patient and her daughter and elected not to make a change at this time.  She will receive a slight Coumadin dose adjustment and will be taking 3 mg of Coumadin daily for total of 21 mg weekly.    Past Medical History:   Diagnosis Date   • Antiphospholipid syndrome (CMS/HCC)    • Atresia (acquired) of cervix     atresia   • Breast cancer (CMS/HCC) 2005    Right with lumpectomy   • Chronic kidney disease     Stage III   • Chronic lymphocytic leukemia (CMS/HCC)    • Cystocele    • Deep vein thrombosis of lower extremity (CMS/HCC)    • Glaucoma    • Gout of big toe 8/10/2016   • History of miscarriage    • History of radiation therapy    • Hyperlipidemia    • Hypertension    • Lymphedema 1/3/2020   • Measles     as child   • Osteoarthritis    • Thrombocytopenia (CMS/HCC)     • Thrombophilia (CMS/HCC) 2/9/2017   • Venous stasis dermatitis of both lower extremities 2/7/2017       ONCOLOGIC HISTORY:  (History from previous dates can be found in the separate document.)    Current Outpatient Medications on File Prior to Visit   Medication Sig Dispense Refill   • diclofenac (VOLTAREN) 1 % gel gel Apply 4 grams to lower extremeties bid as needed 100 g 1   • dorzolamide (TRUSOPT) 2 % ophthalmic solution 1 drop 3 (three) times a day.     • ketoconazole (NIZORAL) 2 % cream Apply  topically to the appropriate area as directed Daily. Under breasts 60 g 1   • lisinopril (PRINIVIL,ZESTRIL) 40 MG tablet Take 40 mg by mouth Daily.     • timolol (TIMOPTIC) 0.25 % ophthalmic solution 1 drop 2 (two) times a day.     • warfarin (COUMADIN) 2 MG tablet 2mg daily unless otherwise directed. 90 tablet 1     No current facility-administered medications on file prior to visit.        ALLERGIES:     Allergies   Allergen Reactions   • Morphine And Related Hives   • Penicillins Itching       Social History     Socioeconomic History   • Marital status:      Spouse name: Not on file   • Number of children: 3   • Years of education: Not on file   • Highest education level: Not on file   Occupational History   • Occupation: ; HR     Employer: RETIRED   Tobacco Use   • Smoking status: Never Smoker   • Smokeless tobacco: Never Used   Substance and Sexual Activity   • Alcohol use: Yes     Frequency: Never     Comment: 1-3 beers q 6 months.    • Drug use: No   • Sexual activity: Not Currently   Lifestyle   • Physical activity:     Days per week: 0 days     Minutes per session: 0 min   • Stress: Not on file   Social History Narrative    Diet - overall healthy; eats fruits and vegetables < daily; cooks at home    Exercise - None    Caffeine - 1-2 cups coffee         Cancer-related family history includes Lung cancer in her sister.     Review of Systems  A comprehensive 14 point review of systems was  "performed and was negative except as mentioned.    Objective      Vitals:    07/06/20 0832   BP: 165/69   Pulse: (!) 49   Resp: 14   Temp: 96.9 °F (36.1 °C)   TempSrc: Tympanic   SpO2: 97%   Weight: 78 kg (172 lb)   Height: 162.6 cm (64.02\")   PainSc: 0-No pain     Current Status 7/6/2020   ECOG score 2       Physical Exam   Constitutional: She appears well-developed and well-nourished.   HENT:   Head: Normocephalic.   Neck: Neck supple.   Cardiovascular: Normal rate.   Pulmonary/Chest: Effort normal and breath sounds normal.   Abdominal: Soft. She exhibits no mass.   Musculoskeletal: She exhibits no edema.   Lymphadenopathy:     She has no cervical adenopathy.   Skin: Skin is warm and dry.   Psychiatric: She has a normal mood and affect. Judgment and thought content normal.   Vitals reviewed.   Transported via wheelchair, with daughter  Areas of skin discoloration with age-related changes     RECENT LABS:  Lab Results   Component Value Date    WBC 9.47 07/06/2020    HGB 12.7 07/06/2020    HCT 41.4 07/06/2020    MCV 97.0 07/06/2020    PLT 85 (L) 07/06/2020     Lab Results   Component Value Date    INR 2.70 (H) 07/06/2020    INR 2.00 (H) 06/24/2020    INR 3.00 (H) 06/10/2020    PROTIME 32.5 (H) 07/06/2020    PROTIME 23.4 (H) 06/24/2020    PROTIME 36.5 (H) 06/10/2020     7/6/2020  IPF  0.90 - 6.50 % 6.80High         Assessment/Plan       1.  CLL.  Her blood counts are stable no indication to initiate therapy at this time  2.  Thrombocytopenia.  Her platelet count slightly better today and remains in a safe range.  3.  Chronic Coumadin anticoagulation with therapeutic INR today of 2.7    Plan  1.  Patient will return every 2 months for lab and RN review with CBC and INR.  2.  MD follow-up in 6 months with CBC, INR, and CMP.  3.  We did request an immature platelet fraction with today's lab also.                "

## 2020-08-31 NOTE — PROGRESS NOTES
CBC reviewed with pt and daughter. Plts 69K. Pt denies bleeding. She continues on coumadin and she does not return for 2 months. Advised pt I would reach out to Dr. Palencia to see if he would like her rechecked sooner.    INR 2.4 today. Pt confirmed previous dosing. Denies missed doses or new medications. Placed in [facility]. Per [facility], pt will remain on same dose of 3mg daily. Informed pt and she v/u. Copy of dosing instructions given to pt.      Lab Results   Component Value Date    WBC 9.03 08/31/2020    HGB 13.6 08/31/2020    HCT 42.6 08/31/2020    MCV 93.0 08/31/2020    PLT 69 (L) 08/31/2020

## 2020-09-02 NOTE — TELEPHONE ENCOUNTER
Called pt and informed her of Dr. Palencia's instructions. She v/u. Message sent to scheduling to make apt. Scheduling should call pt's daughter to make apt.     ----- Message from Hugo Palencia MD sent at 9/2/2020  9:05 AM EDT -----  Yes thanks. Please check a PT/INR and CBC in 2 wks  ----- Message -----  From: Andreia Padilla RN  Sent: 8/31/2020   9:24 AM EDT  To: MD Dr. Talha Mathew,  I saw pt today in lab. Her plts have declined slightly to 69K. She denies any bleeding however she is on coumadin, INR 2.4 today. We are checking her every 2 months. Pt does not want to return sooner but didn't know if you felt it necessary given her current platelet count? Please advise. Thank you

## 2020-10-28 NOTE — PROGRESS NOTES
INR 3.1 today. Pt confirmed previous dosing.Pt denies any missed doses or new medications. Goal 2-2.5. Placed in [facility]. Per [facility], pt will now take 2mg Wed, Sat and 3mg all other days. Advised for pt to return in 1-2 weeks for recheck. She v/u.       CBC reviewed with pt and daughter. Plts stable at 84K. Copy of labs given to daughter.       Lab Results   Component Value Date    WBC 11.02 (H) 10/28/2020    HGB 14.6 10/28/2020    HCT 45.9 10/28/2020    MCV 94.4 10/28/2020    PLT 84 (L) 10/28/2020

## 2020-11-10 NOTE — NURSING NOTE
Pt presents for INR review with INR 2.6 today. Dosing confirmed by pt and daughter. They deny any missed doses, new medications or bleeding concerns. Per [facility], pt to decrease to 2 mg M/W/F and 3 mg all other days. Copy of new dosing instructions given and appts reviewed. Pt will return next month for INR and MD visit. Pt and daughter v/u and will call with any concerns before their next appt.

## 2020-12-15 NOTE — DISCHARGE INSTRUCTIONS
Wear boston hose, or compression stockings, to help with swelling  Elevate your legs to reduce swelling  Take medications as directed  If you develop blisters, do not open them. If they rupture on their own, clean the skin and cover with a band aid.   Follow up with your doctor   Return if worse or new concerns   Continue care with your primary care physician and have your blood pressure regularly checked and managed. Normal blood pressure is 120/80.

## 2020-12-15 NOTE — ED TRIAGE NOTES
Her legs and feet bilat are blistered and swollen    Patient was placed in face mask during first look triage.  Patient was wearing a face mask throughout encounter.  I wore personal protective equipment throughout the encounter.  Hand hygiene was performed before and after patient encounter.

## 2020-12-15 NOTE — ED PROVIDER NOTES
The ROSAURA and I have discussed this patients history, physical exam, and treatment plan. I have reviewed the documentation and personally had a face to face interaction with the patient. I affirm the documentation and agree with the treatment and plan.  The following note describes my personal findings    This patient is a 89-year-old female presenting to the emergency room today with bilateral lower extremity edema that is been present for several months to years now.  The patient denies really any significant recent changes to this as well as denying chest pain or shortness of breath.    Exam: This patient is resting comfortably and in no distress, without gross neurological deficit.  She is afebrile with stable vital signs and nontoxic in appearance.  Lungs are clear to auscultation bilaterally.  Lower extremities do show bilateral 2+ pitting edema which does appear fairly chronic in nature.  There is no weeping of wounds or any erythema or tenderness.  Compartments are soft and pulses are palpable.    Plan: Labs will be obtained as well as a Doppler ultrasound of the lower extremities.  We will reassess following.      The patient was wearing a facemask upon entrance into the room and remained in such throughout their visit.  I was wearing PPE including a facemask, eye protection, as well as gloves at any point entering the room and throughout the visit     Edgar Hutchinson MD  12/15/20 2031

## 2020-12-15 NOTE — ED PROVIDER NOTES
EMERGENCY DEPARTMENT ENCOUNTER    Room Number:  14/14  Date of encounter:  12/15/2020  PCP: Emeka Rojas MD  Historian: Pateint   Full history not obtainable due to: None     HPI:  Chief Complaint: BLE edema    Context: Virginia Benavidez is a 89 y.o. female who presents to the ED c/o BLE edema onset several years ago. Worse x the last 2 months.  Swelling has been constant and is mild to moderate in nature and occurs across the lower legs.  Nothing seems to worsen or improve the swelling.  She reports associated bilateral lower extremity pain times the last several days.  No fall or injury.  Denies any chest pain or difficulty breathing.  Denies any weight gain.  She believes she is on any anticoagulant medication but she does not know the indication.  States her daughter has a list of her medical problems.    PAST MEDICAL HISTORY    Active Ambulatory Problems     Diagnosis Date Noted   • Essential hypertension 01/25/2016   • Gait instability 01/25/2016   • Prediabetes 01/25/2016   • Degenerative joint disease involving multiple joints 01/25/2016   • Primary osteoarthritis involving multiple joints 02/04/2016   • Long term current use of anticoagulant 05/25/2016   • Venous stasis dermatitis of both lower extremities 02/07/2017   • CLL (chronic lymphocytic leukemia) (CMS/HCC) 04/23/2019   • Thrombocytopenia (CMS/[facility]) 10/23/2019   • Other specified glaucoma 01/03/2020   • Lymphedema 01/03/2020   • Muscular deconditioning 01/03/2020   • At high risk for falls 01/03/2020     Resolved Ambulatory Problems     Diagnosis Date Noted   • Chronic kidney disease (CKD), stage III (moderate) (CMS/HCC) 01/25/2016   • Broken lower leg 01/25/2016   • HLD (hyperlipidemia) 01/25/2016   • Disorder of peripheral nervous system 01/25/2016   • Gout of big toe 08/10/2016   • Thrombophilia (CMS/HCC) 02/09/2017     Past Medical History:   Diagnosis Date   • Antiphospholipid syndrome (CMS/[facility])    • Atresia (acquired) of cervix    • Breast  cancer (CMS/HCC)    • Chronic kidney disease    • Chronic lymphocytic leukemia (CMS/HCC)    • Cystocele    • Deep vein thrombosis of lower extremity (CMS/HCC)    • Glaucoma    • History of miscarriage    • History of radiation therapy    • Hyperlipidemia    • Hypertension    • Measles    • Osteoarthritis          PAST SURGICAL HISTORY  Past Surgical History:   Procedure Laterality Date   • BREAST BIOPSY     • BREAST LUMPECTOMY Right    • CHOLECYSTECTOMY           FAMILY HISTORY  Family History   Problem Relation Age of Onset   • Alzheimer's disease Mother    • Emphysema Father    • Lung cancer Sister         smoker   • Other Brother          as infant         SOCIAL HISTORY  Social History     Socioeconomic History   • Marital status:      Spouse name: Not on file   • Number of children: 3   • Years of education: Not on file   • Highest education level: Not on file   Occupational History   • Occupation: ; HR     Employer: RETIRED   Tobacco Use   • Smoking status: Never Smoker   • Smokeless tobacco: Never Used   Substance and Sexual Activity   • Alcohol use: Yes     Frequency: Never     Comment: 1-3 beers q 6 months.    • Drug use: No   • Sexual activity: Not Currently   Lifestyle   • Physical activity     Days per week: 0 days     Minutes per session: 0 min   • Stress: Not on file   Social History Narrative    Diet - overall healthy; eats fruits and vegetables < daily; cooks at home    Exercise - None    Caffeine - 1-2 cups coffee         ALLERGIES  Morphine and related and Penicillins        REVIEW OF SYSTEMS  Review of Systems   All systems reviewed and marked as negative except as listed in HPI       PHYSICAL EXAM    I have reviewed the triage vital signs and nursing notes.    ED Triage Vitals [12/15/20 1216]   Temp Heart Rate Resp BP SpO2   97.4 °F (36.3 °C) 63 16 -- 99 %      Temp src Heart Rate Source Patient Position BP Location FiO2 (%)   Tympanic Monitor -- -- --        GENERAL: Alert well developed, well nourished in no distress  HENT: NCAT, neck supple, trachea midline  EYES: no scleral icterus, PERRL, normal conjunctivae  CV: regular rhythm, regular rate, no murmur  RESPIRATORY: unlabored effort, CTAB  ABDOMEN: soft, nontender, nondistended, bowel sounds present  MUSCULOSKELETAL: no gross deformity.  Bilateral lower extremities edema is present, 1-2+.  Nonpitting.  Pedal pulses were not readily palpable but present on Doppler exam.  Capillary refill is 3 seconds.  Distal extremity skin is cooler to patient when compared to the more proximal region of the bilateral lower extremities.  There is no rash or lymphangitis.  Skin of the dorsal proximal left lower extremity shin is peeling and cracked.  NEURO: alert,  sensory and motor function of extremities grossly intact, speech clear, mental status normal/baseline  SKIN: warm, dry, no rash  PSYCH:  Appropriate mood and affect    Vital signs and nursing notes reviewed.          LAB RESULTS  Recent Results (from the past 24 hour(s))   ECG 12 Lead    Collection Time: 12/15/20  1:43 PM   Result Value Ref Range    QT Interval 515 ms   Comprehensive Metabolic Panel    Collection Time: 12/15/20  2:03 PM    Specimen: Blood   Result Value Ref Range    Glucose 89 65 - 99 mg/dL    BUN 18 8 - 23 mg/dL    Creatinine 0.74 0.57 - 1.00 mg/dL    Sodium 142 136 - 145 mmol/L    Potassium 4.0 3.5 - 5.2 mmol/L    Chloride 108 (H) 98 - 107 mmol/L    CO2 26.9 22.0 - 29.0 mmol/L    Calcium 9.2 8.2 - 9.6 mg/dL    Total Protein 6.4 6.0 - 8.5 g/dL    Albumin 3.80 3.50 - 5.20 g/dL    ALT (SGPT) 9 1 - 33 U/L    AST (SGOT) 15 1 - 32 U/L    Alkaline Phosphatase 56 39 - 117 U/L    Total Bilirubin 0.4 0.0 - 1.2 mg/dL    eGFR Non African Amer 73 >60 mL/min/1.73    Globulin 2.6 gm/dL    A/G Ratio 1.5 g/dL    BUN/Creatinine Ratio 24.3 7.0 - 25.0    Anion Gap 7.1 5.0 - 15.0 mmol/L   BNP    Collection Time: 12/15/20  2:03 PM    Specimen: Blood   Result Value Ref  Range    proBNP 951.9 0.0-1,800.0 pg/mL   Troponin    Collection Time: 12/15/20  2:03 PM    Specimen: Blood   Result Value Ref Range    Troponin T <0.010 0.000 - 0.030 ng/mL   Protime-INR    Collection Time: 12/15/20  2:03 PM    Specimen: Blood   Result Value Ref Range    Protime 19.7 (H) 11.7 - 14.2 Seconds    INR 1.70 (H) 0.90 - 1.10   CBC Auto Differential    Collection Time: 12/15/20  2:03 PM    Specimen: Blood   Result Value Ref Range    WBC 8.69 3.40 - 10.80 10*3/mm3    RBC 4.78 3.77 - 5.28 10*6/mm3    Hemoglobin 14.1 12.0 - 15.9 g/dL    Hematocrit 43.4 34.0 - 46.6 %    MCV 90.8 79.0 - 97.0 fL    MCH 29.5 26.6 - 33.0 pg    MCHC 32.5 31.5 - 35.7 g/dL    RDW 13.2 12.3 - 15.4 %    RDW-SD 43.7 37.0 - 54.0 fl    MPV 11.7 6.0 - 12.0 fL    Platelets 82 (L) 140 - 450 10*3/mm3   Manual Differential    Collection Time: 12/15/20  2:03 PM    Specimen: Blood   Result Value Ref Range    Neutrophil % 26.0 (L) 42.7 - 76.0 %    Lymphocyte % 57.0 (H) 19.6 - 45.3 %    Monocyte % 7.0 5.0 - 12.0 %    Eosinophil % 2.0 0.3 - 6.2 %    Atypical Lymphocyte % 8.0 (H) 0.0 - 5.0 %    Neutrophils Absolute 2.26 1.70 - 7.00 10*3/mm3    Lymphocytes Absolute 4.95 (H) 0.70 - 3.10 10*3/mm3    Monocytes Absolute 0.61 0.10 - 0.90 10*3/mm3    Eosinophils Absolute 0.17 0.00 - 0.40 10*3/mm3    RBC Morphology Normal Normal    WBC Morphology Normal Normal    Platelet Morphology Normal Normal   Duplex Venous Lower Extremity - Bilateral CAR    Collection Time: 12/15/20  5:10 PM   Result Value Ref Range    Right Common Femoral Spont Y     Right Common Femoral Phasic Y     Right Common Femoral Augment Y     Right Common Femoral Competent Y     Right Common Femoral Compress C     Right Saphenofemoral Junction Compress C     Right Profunda Femoral Compress C     Right Proximal Femoral Compress C     Right Mid Femoral Spont Y     Right Mid Femoral Phasic Y     Right Mid Femoral Augment Y     Right Mid Femoral Competent Y     Right Mid Femoral Compress C      Right Distal Femoral Compress C     Right Popliteal Spont Y     Right Popliteal Phasic Y     Right Popliteal Augment Y     Right Popliteal Competent Y     Right Popliteal Compress P     Right Posterior Tibial Compress C     Right Peroneal Compress C     Right GastronemiusSoleal Compress C     Right Greater Saph AK Compress C     Right Greater Saph BK Compress C     Right Lesser Saph Compress C     Left Common Femoral Spont Y     Left Common Femoral Phasic Y     Left Common Femoral Augment Y     Left Common Femoral Competent Y     Left Common Femoral Compress C     Left Saphenofemoral Junction Compress C     Left Profunda Femoral Compress C     Left Proximal Femoral Compress C     Left Mid Femoral Spont Y     Left Mid Femoral Phasic Y     Left Mid Femoral Augment Y     Left Mid Femoral Competent Y     Left Mid Femoral Compress C     Left Distal Femoral Compress C     Left Popliteal Spont Y     Left Popliteal Phasic Y     Left Popliteal Augment Y     Left Popliteal Competent Y     Left Popliteal Compress C     Left Posterior Tibial Compress C     Left Peroneal Compress C     Left GastronemiusSoleal Compress C     Left Greater Saph AK Compress C     Left Greater Saph BK Compress C     Left Lesser Saph Compress C     Right Popliteal Thrombus C     BH CV POP FLUID COLLECT LEFT 1        Ordered the above labs and independently reviewed the results.        PROCEDURES    Procedures        MEDICATIONS GIVEN IN ER    Medications   sodium chloride 0.9 % flush 10 mL (has no administration in time range)   furosemide (LASIX) injection 40 mg (40 mg Intravenous Given 12/15/20 1755)         PROGRESS, DATA ANALYSIS, CONSULTS, AND MEDICAL DECISION MAKING    All labs have been independently reviewed by me.  All radiology studies have been reviewed by me.   EKG's independently reviewed by me.  Discussion below represents my analysis of pertinent findings related to patient's condition, differential diagnosis, treatment plan and final  disposition.    DIFFERENTIAL DIAGNOSIS INCLUDE BUT NOT LIMITED TO: Congestive heart failure exacerbation, anasarca, venous stasis, DVT, cellulitis    ED Course as of Dec 15 1942   Tue Dec 15, 2020   1427 Pedal pulses  were dopplered on BLE.     [JS]   1447 INR(!): 1.70 [JS]   1447 WBC: 8.69 [JS]   1447 Hemoglobin: 14.1 [JS]   1447 Hematocrit: 43.4 [JS]   1447 Troponin T: <0.010 [JS]   1447 proBNP: 951.9 [JS]   1711 Discussed pt with Meaghan in vascular, pt is negative for DVT.     [JS]   1712 Sodium: 142 [JS]   1712 Creatinine: 0.74 [JS]   1712 Potassium: 4.0 [JS]   1712 WBC: 8.69 [JS]   1712 Hemoglobin: 14.1 [JS]   1712 Troponin T: <0.010 [JS]   1714 Patient is negative for DVT.  BNP is normal.  White blood count is normal.  She is not anemic.  She denies any shortness of breath.  She is hypertensive and has dependent edema on exam.  She began a one-time dose of Lasix in the emergency department and prescription for 3 days to help with swelling.  She is to follow-up closely with her family doctor this week.  Additionally she was given instructions to obtain ERIC hose and elevate her legs to reduce swelling.  Return precautions given.  The patient is stable and in no distress at this time.  Ready for discharge.    [JS]      ED Course User Index  [JS] Bernarda Angel Russell, APRN       AS OF 19:42 EST VITALS:        BP - 168/70  HR - 58  TEMP - 97.4 °F (36.3 °C) (Tympanic)  02 SATS - 98%          DIAGNOSIS  Final diagnoses:   Dependent edema         DISPOSITION  Discharge    Pt masked in first look. I wore a surgical mask and protective eye wear throughout my encounters with the pt. I performed hand hygiene on entry into the pt room and upon exit.     Dictated utilizing Dragon dictation:  Much of this encounter note is an electronic transcription/translation of spoken language to printed text. The electronic translation of spoken language may permit erroneous, or at times, nonsensical words or phrases to be  inadvertently transcribed; Although I have reviewed the note for such errors, some may still exist.       Bernarda Angel, APRN  12/15/20 1942

## 2020-12-15 NOTE — ED NOTES
"Pt presents to ED with complaints of wounds bilaterally lower extremities for 'about 3months\", per pt.  Pt states she has been using epson salt to soak them. Pt states that on Sunday the LLE wound opened. Swelling bilat LE present. Denies fevers and CHF.     This RN wore appropriate PPE during this encounter.       Debra Sousa, RN  12/15/20 3494    "

## 2021-01-01 ENCOUNTER — TELEPHONE (OUTPATIENT)
Dept: INTERNAL MEDICINE | Facility: CLINIC | Age: 86
End: 2021-01-01

## 2021-01-01 ENCOUNTER — HOSPITAL ENCOUNTER (OUTPATIENT)
Dept: CT IMAGING | Facility: HOSPITAL | Age: 86
Discharge: HOME | End: 2021-01-21
Admitting: INTERNAL MEDICINE

## 2021-01-01 ENCOUNTER — LAB (OUTPATIENT)
Dept: LAB | Facility: HOSPITAL | Age: 86
End: 2021-01-01

## 2021-01-01 ENCOUNTER — OFFICE VISIT (OUTPATIENT)
Dept: INTERNAL MEDICINE | Facility: CLINIC | Age: 86
End: 2021-01-01

## 2021-01-01 ENCOUNTER — OFFICE VISIT (OUTPATIENT)
Dept: ONCOLOGY | Facility: CLINIC | Age: 86
End: 2021-01-01

## 2021-01-01 VITALS
BODY MASS INDEX: 30.37 KG/M2 | HEIGHT: 64 IN | RESPIRATION RATE: 18 BRPM | OXYGEN SATURATION: 97 % | TEMPERATURE: 97.1 F | SYSTOLIC BLOOD PRESSURE: 152 MMHG | DIASTOLIC BLOOD PRESSURE: 77 MMHG | HEART RATE: 63 BPM

## 2021-01-01 VITALS
HEART RATE: 58 BPM | WEIGHT: 177 LBS | TEMPERATURE: 96.9 F | SYSTOLIC BLOOD PRESSURE: 120 MMHG | HEIGHT: 65 IN | DIASTOLIC BLOOD PRESSURE: 70 MMHG | OXYGEN SATURATION: 98 % | BODY MASS INDEX: 29.49 KG/M2

## 2021-01-01 DIAGNOSIS — Z91.81 AT HIGH RISK FOR FALLS: ICD-10-CM

## 2021-01-01 DIAGNOSIS — Z91.81 AT HIGH RISK FOR FALLS: Primary | ICD-10-CM

## 2021-01-01 DIAGNOSIS — D69.6 THROMBOCYTOPENIA (HCC): ICD-10-CM

## 2021-01-01 DIAGNOSIS — R41.82 ALTERED MENTAL STATUS, UNSPECIFIED ALTERED MENTAL STATUS TYPE: Primary | ICD-10-CM

## 2021-01-01 DIAGNOSIS — Z23 ENCOUNTER FOR IMMUNIZATION: ICD-10-CM

## 2021-01-01 DIAGNOSIS — I87.2 VENOUS STASIS DERMATITIS OF BOTH LOWER EXTREMITIES: Primary | ICD-10-CM

## 2021-01-01 DIAGNOSIS — C91.10 CLL (CHRONIC LYMPHOCYTIC LEUKEMIA) (HCC): Primary | ICD-10-CM

## 2021-01-01 DIAGNOSIS — Z79.01 LONG TERM CURRENT USE OF ANTICOAGULANT: Primary | ICD-10-CM

## 2021-01-01 DIAGNOSIS — R26.81 GAIT INSTABILITY: ICD-10-CM

## 2021-01-01 DIAGNOSIS — Z79.01 LONG TERM CURRENT USE OF ANTICOAGULANT: ICD-10-CM

## 2021-01-01 DIAGNOSIS — R41.3 MEMORY LOSS: ICD-10-CM

## 2021-01-01 DIAGNOSIS — C91.10 CLL (CHRONIC LYMPHOCYTIC LEUKEMIA) (HCC): ICD-10-CM

## 2021-01-01 DIAGNOSIS — H90.3 SENSORINEURAL HEARING LOSS (SNHL) OF BOTH EARS: ICD-10-CM

## 2021-01-01 DIAGNOSIS — R29.898 MUSCULAR DECONDITIONING: ICD-10-CM

## 2021-01-01 DIAGNOSIS — D68.8 OTHER SPECIFIED COAGULATION DEFECTS (HCC): ICD-10-CM

## 2021-01-01 DIAGNOSIS — R41.82 ALTERED MENTAL STATUS, UNSPECIFIED ALTERED MENTAL STATUS TYPE: ICD-10-CM

## 2021-01-01 DIAGNOSIS — E53.8 B12 DEFICIENCY: Primary | ICD-10-CM

## 2021-01-01 DIAGNOSIS — Z00.00 HEALTHCARE MAINTENANCE: ICD-10-CM

## 2021-01-01 DIAGNOSIS — E53.8 B12 DEFICIENCY: ICD-10-CM

## 2021-01-01 DIAGNOSIS — I87.2 VENOUS STASIS DERMATITIS OF BOTH LOWER EXTREMITIES: ICD-10-CM

## 2021-01-01 LAB
ALBUMIN SERPL-MCNC: 3.4 G/DL (ref 3.5–5.2)
ALBUMIN/GLOB SERPL: 1.3 G/DL (ref 1.1–2.4)
ALP SERPL-CCNC: 61 U/L (ref 38–116)
ALT SERPL W P-5'-P-CCNC: 6 U/L (ref 0–33)
ANION GAP SERPL CALCULATED.3IONS-SCNC: 8.5 MMOL/L (ref 5–15)
AST SERPL-CCNC: 11 U/L (ref 0–32)
BASOPHILS # BLD AUTO: 0.02 10*3/MM3 (ref 0–0.2)
BASOPHILS NFR BLD AUTO: 0.2 % (ref 0–1.5)
BILIRUB BLD-MCNC: NEGATIVE MG/DL
BILIRUB SERPL-MCNC: 0.4 MG/DL (ref 0.2–1.2)
BUN SERPL-MCNC: 18 MG/DL (ref 6–20)
BUN/CREAT SERPL: 17.1 (ref 7.3–30)
CALCIUM SPEC-SCNC: 9.2 MG/DL (ref 8.5–10.2)
CHLORIDE SERPL-SCNC: 109 MMOL/L (ref 98–107)
CLARITY, POC: CLEAR
CO2 SERPL-SCNC: 23.5 MMOL/L (ref 22–29)
COLOR UR: YELLOW
CREAT SERPL-MCNC: 1.05 MG/DL (ref 0.6–1.1)
DEPRECATED RDW RBC AUTO: 49.8 FL (ref 37–54)
EOSINOPHIL # BLD AUTO: 0.03 10*3/MM3 (ref 0–0.4)
EOSINOPHIL NFR BLD AUTO: 0.3 % (ref 0.3–6.2)
ERYTHROCYTE [DISTWIDTH] IN BLOOD BY AUTOMATED COUNT: 14 % (ref 12.3–15.4)
FOLATE SERPL-MCNC: 7.2 NG/ML (ref 4.78–24.2)
GFR SERPL CREATININE-BSD FRML MDRD: 49 ML/MIN/1.73
GLOBULIN UR ELPH-MCNC: 2.7 GM/DL (ref 1.8–3.5)
GLUCOSE SERPL-MCNC: 125 MG/DL (ref 74–124)
GLUCOSE UR STRIP-MCNC: NEGATIVE MG/DL
HCT VFR BLD AUTO: 41.3 % (ref 34–46.6)
HGB BLD-MCNC: 12.8 G/DL (ref 12–15.9)
IMM GRANULOCYTES # BLD AUTO: 0.01 10*3/MM3 (ref 0–0.05)
IMM GRANULOCYTES NFR BLD AUTO: 0.1 % (ref 0–0.5)
INR PPP: 1.7 (ref 0.9–1.1)
KETONES UR QL: NEGATIVE
LEUKOCYTE EST, POC: NEGATIVE
LYMPHOCYTES # BLD AUTO: 5.64 10*3/MM3 (ref 0.7–3.1)
LYMPHOCYTES NFR BLD AUTO: 63.4 % (ref 19.6–45.3)
Lab: ABNORMAL
Lab: NORMAL
MCH RBC QN AUTO: 29.8 PG (ref 26.6–33)
MCHC RBC AUTO-ENTMCNC: 31 G/DL (ref 31.5–35.7)
MCV RBC AUTO: 96.3 FL (ref 79–97)
METHYLMALONATE SERPL-SCNC: 487 NMOL/L (ref 0–378)
MONOCYTES # BLD AUTO: 0.78 10*3/MM3 (ref 0.1–0.9)
MONOCYTES NFR BLD AUTO: 8.8 % (ref 5–12)
NEUTROPHILS NFR BLD AUTO: 2.42 10*3/MM3 (ref 1.7–7)
NEUTROPHILS NFR BLD AUTO: 27.2 % (ref 42.7–76)
NITRITE UR-MCNC: NEGATIVE MG/ML
NRBC BLD AUTO-RTO: 0 /100 WBC (ref 0–0.2)
PH UR: 5 [PH] (ref 5–8)
PLATELET # BLD AUTO: 98 10*3/MM3 (ref 140–450)
PMV BLD AUTO: 11.1 FL (ref 6–12)
POTASSIUM SERPL-SCNC: 4 MMOL/L (ref 3.5–4.7)
PROT SERPL-MCNC: 6.1 G/DL (ref 6.3–8)
PROT UR STRIP-MCNC: ABNORMAL MG/DL
PROTHROMBIN TIME: 20.7 SECONDS (ref 11–13.5)
RBC # BLD AUTO: 4.29 10*6/MM3 (ref 3.77–5.28)
RBC # UR STRIP: ABNORMAL /UL
RPR SER QL: NORMAL
SODIUM SERPL-SCNC: 141 MMOL/L (ref 134–145)
SP GR UR: 1.02 (ref 1–1.03)
TSH SERPL DL<=0.005 MIU/L-ACNC: 2.94 UIU/ML (ref 0.27–4.2)
UROBILINOGEN UR QL: NORMAL
VIT B12 SERPL-MCNC: 216 PG/ML (ref 211–946)
WBC # BLD AUTO: 8.9 10*3/MM3 (ref 3.4–10.8)
WRITTEN AUTHORIZATION: NORMAL

## 2021-01-01 PROCEDURE — 70450 CT HEAD/BRAIN W/O DYE: CPT

## 2021-01-01 PROCEDURE — 99214 OFFICE O/P EST MOD 30 MIN: CPT | Performed by: INTERNAL MEDICINE

## 2021-01-01 PROCEDURE — G0008 ADMIN INFLUENZA VIRUS VAC: HCPCS | Performed by: INTERNAL MEDICINE

## 2021-01-01 PROCEDURE — 81003 URINALYSIS AUTO W/O SCOPE: CPT | Performed by: INTERNAL MEDICINE

## 2021-01-01 PROCEDURE — 80053 COMPREHEN METABOLIC PANEL: CPT

## 2021-01-01 PROCEDURE — 85610 PROTHROMBIN TIME: CPT

## 2021-01-01 PROCEDURE — 36415 COLL VENOUS BLD VENIPUNCTURE: CPT

## 2021-01-01 PROCEDURE — 85025 COMPLETE CBC W/AUTO DIFF WBC: CPT

## 2021-01-01 PROCEDURE — 90694 VACC AIIV4 NO PRSRV 0.5ML IM: CPT | Performed by: INTERNAL MEDICINE

## 2021-01-01 RX ORDER — BETAMETHASONE DIPROPIONATE 0.5 MG/G
CREAM TOPICAL DAILY
Qty: 45 G | Refills: 1 | Status: SHIPPED | OUTPATIENT
Start: 2021-01-01

## 2021-01-01 RX ORDER — LANOLIN ALCOHOL/MO/W.PET/CERES
1000 CREAM (GRAM) TOPICAL DAILY
Qty: 30 TABLET | Refills: 5 | Status: SHIPPED | OUTPATIENT
Start: 2021-01-01

## 2021-01-04 PROBLEM — H90.3 SENSORINEURAL HEARING LOSS (SNHL) OF BOTH EARS: Status: ACTIVE | Noted: 2021-01-01

## 2021-01-04 NOTE — PROGRESS NOTES
"ALTERED MENTAL STATUS      HPI  Virginia Benavidez is a 89 y.o. female RTC In acute care:   Pt notes that \"nothing wrong with me'.    Daughter notes 'that since August has been having episodes of seeing people in her house.  Feeling like people are in the house\".  Will hear people in house all the time day and night.  \"She hears them all the time'.  At night more hearing people more than seeing them.    Has called 911 and hit [employer] button a couple of times.    Pt admits to hearing them and notes 'and they are in my house'.  Notes has never seen them. Thinks people are in basement.  Family has been to basement and all doors locked and daughter notes 'there is no one down there'.      Still lives alone.  However, someone has been with her 24/ 7 since Christmas due to these changes.     Pt is mad at family.  Mad that bringing pt here for these issues.      Last night got up at 3:30 Am and turned on all the lights and got family up.   Last night thought people were moving out her furniture.     In ED a few weeks ago for swelling legs.  Given some lasix and helped some. Feels like mental status worse since lasix, but only took 3 pills.     Has not fallen in a long time. 'I come pretty close sometimes, but I have not fallen\".       Pt clearly does not want to be here and is angry. States she does not want to be here. Daughter notes she is 'not happy with me' and really did not want to be seen by me.     Review of Systems   Constitution: Negative for chills, fever and malaise/fatigue.   Cardiovascular: Positive for leg swelling. Negative for dyspnea on exertion.   Respiratory: Negative for cough and shortness of breath.    Hematologic/Lymphatic: Negative for bleeding problem.   Skin: Positive for suspicious lesions (spot on face.  Present for months.  Picks at end and gets 'core out' and then comes back. No pain now. ).   Musculoskeletal: Negative for falls.   Gastrointestinal: Negative for change in bowel habit. " "  Psychiatric/Behavioral: Positive for altered mental status, hallucinations and memory loss. Negative for depression. The patient does not have insomnia.        The following portions of the patient's history were reviewed and updated as appropriate: allergies, current medications, past medical history, past social history and problem list.      Current Outpatient Medications:   •  diclofenac (VOLTAREN) 1 % gel gel, Apply 4 grams to lower extremeties bid as needed, Disp: 100 g, Rfl: 1  •  dorzolamide (TRUSOPT) 2 % ophthalmic solution, 1 drop 3 (three) times a day., Disp: , Rfl:   •  furosemide (LASIX) 20 MG tablet, Take 1 tablet by mouth Daily., Disp: 3 tablet, Rfl: 0  •  ketoconazole (NIZORAL) 2 % cream, Apply  topically to the appropriate area as directed Daily. Under breasts, Disp: 60 g, Rfl: 1  •  lisinopril (PRINIVIL,ZESTRIL) 40 MG tablet, Take 40 mg by mouth Daily., Disp: , Rfl:   •  timolol (TIMOPTIC) 0.25 % ophthalmic solution, 1 drop 2 (two) times a day., Disp: , Rfl:   •  warfarin (COUMADIN) 2 MG tablet, Take 3 mg daily UNLESS OTHERWISE DIRECTED, Disp: 90 tablet, Rfl: 3  •  betamethasone dipropionate 0.05 % cream, Apply  topically to the appropriate area as directed Daily. To lower legs., Disp: 45 g, Rfl: 1    Vitals:    01/04/21 1052   BP: 120/70   Pulse: 58   Temp: 96.9 °F (36.1 °C)   SpO2: 98%   Weight: 80.3 kg (177 lb)   Height: 165.1 cm (65\")     Body mass index is 29.45 kg/m².      Physical Exam  Vitals signs reviewed.   Constitutional:       General: She is not in acute distress.     Appearance: She is well-developed. She is not ill-appearing or toxic-appearing.   HENT:      Head: Normocephalic and atraumatic.      Right Ear: Decreased hearing noted.      Left Ear: Decreased hearing noted.      Nose: No mucosal edema or rhinorrhea.      Right Sinus: No maxillary sinus tenderness or frontal sinus tenderness.      Left Sinus: No maxillary sinus tenderness or frontal sinus tenderness.      " Mouth/Throat:      Mouth: Mucous membranes are not pale, not dry and not cyanotic. No oral lesions.      Pharynx: Uvula midline.   Eyes:      General:         Right eye: No discharge.         Left eye: No discharge.      Conjunctiva/sclera: Conjunctivae normal.      Pupils: Pupils are equal, round, and reactive to light.   Neck:      Musculoskeletal: Normal range of motion and neck supple.   Cardiovascular:      Rate and Rhythm: Normal rate and regular rhythm.      Heart sounds: Normal heart sounds.   Pulmonary:      Effort: Pulmonary effort is normal. No respiratory distress.      Breath sounds: Normal breath sounds. No wheezing, rhonchi or rales.   Abdominal:      General: Abdomen is flat. There is no distension.      Palpations: Abdomen is soft.      Tenderness: There is no abdominal tenderness. There is no guarding or rebound.   Musculoskeletal:      Right lower leg: Edema (1+ doughy edema, thick leathery skin) present.      Left lower leg: Edema (1+ doughy edema, thick leathery skin) present.   Lymphadenopathy:      Cervical: No cervical adenopathy.   Skin:     Findings: Rash (B lower legs) present.          Neurological:      Mental Status: She is alert and oriented to person, place, and time.      Cranial Nerves: No cranial nerve deficit or facial asymmetry.      Comments: MMSE 23/30   Psychiatric:         Mood and Affect: Mood is not anxious or depressed. Affect is angry. Affect is not flat or tearful.         Speech: Speech normal.         Behavior: Behavior normal.         Thought Content: Thought content normal.         Cognition and Memory: Cognition is impaired (variable in conversation).         Results for orders placed or performed in visit on 01/04/21   POC Urinalysis Dipstick, Automated    Specimen: Urine   Result Value Ref Range    Color Yellow Yellow, Straw, Dark Yellow, Aletha    Clarity, UA Clear Clear    Specific Gravity  1.025 1.005 - 1.030    pH, Urine 5.0 5.0 - 8.0    Leukocytes Negative  Negative    Nitrite, UA Negative Negative    Protein, POC Trace (A) Negative mg/dL    Glucose, UA Negative Negative, 1000 mg/dL (3+) mg/dL    Ketones, UA Negative Negative    Urobilinogen, UA Normal Normal    Bilirubin Negative Negative    Blood, UA Trace (A) Negative       Assessment/ Plan  Diagnoses and all orders for this visit:    Altered mental status, unspecified altered mental status type  -     POC Urinalysis Dipstick, Automated  -     TSH  -     Vitamin B12  -     Folate  -     RPR  -     CT Head Without Contrast; Future  -     Ambulatory Referral to Neuropsychology    Memory loss  -     Ambulatory Referral to Neuropsychology    Sensorineural hearing loss (SNHL) of both ears    Venous stasis dermatitis of both lower extremities  -     betamethasone dipropionate 0.05 % cream; Apply  topically to the appropriate area as directed Daily. To lower legs.  -     Compression Knee High Stockings    CLL (chronic lymphocytic leukemia) (CMS/[facility])  -     CT Head Without Contrast; Future    At high risk for falls  -     CT Head Without Contrast; Future  -     Ambulatory Referral to Neuropsychology    Healthcare maintenance  -     Fluad Quad 65+ yrs ([id number])    Encounter for immunization   -     Fluad Quad 65+ yrs ([id number])        No follow-ups on file.      Discussion:  Virginia Benavidez is a 89 y.o. female with hx of CLL, seen once in past by me on transfer from Dr. Yip, RTC In acute care (new issue to examiner) at urging of daughter who accompanies pt today. Recall, I was very concerned meeting pt in 1/2020 about falling at home frequently, which pt denies today.  Family notes in last ~6 months has been confused and hallucinating (auditory) that people are in her home, paranoid stealing her stuff.  Concering as pt living alone still, but family with her '24/7' in last few weeks due to acceleration of sx. Pt is adamant nothing is wrong.  Recent labs in ED, seen for leg edema that is clearly contributed to by  chronic venous stasis dermatitis, were unremarkable (including BNP). Udip (-) today.  I had very clear and direct discussion with pt today.  I am concerned for developing dementia and will need to complete additional labs and neuropsych eval (23/30 on MMSE in office).  I was clear that her cooperation would make process with family easier and her refusal to allow eval will make things harder.  Will proceed with CT head ASAP given my suspicion pt is still falling at home.   Will need MRI ultimately if CT head is unrevealing.  Will f/u after labs and head imaging. Daughter will assist scheduling neuropsych eval.   Venous stasis - compression socks daily with Eucerin cream.  Betamethasone ointment at night to inflammation.   HM - flu shot today    RTC TBA after above.

## 2021-01-04 NOTE — TELEPHONE ENCOUNTER
PT'S DAUGHTER ELDA CALLED STATING THEY ARE STRUGGLING WITH CHANGES IN PT'S MENTAL STATUS, AND SHE IS WANTING TO SPEAK TO SOMEONE CLINICAL ASAP TO DISCUSS OPTIONS. SHE STATES PT IS REFUSING TO COME TO THE OFFICE, BUT SHE NEEDS HELP OF SOME KIND.    SHE STATES SHE HAS BEEN IN TOUCH WITH A NEUROLOGIST, AND NEEDS A REFERRAL TO BE ABLE TO SEE THEM.    SHE WANTS A REFERRAL TO DR. GAMBOA, DR. RAMIREZ, AND DR. FOY'S OFFICE.  TO THEIR NUMBER [phone], FAX NUMBER [phone]. SHE WANTS TO SEE WHOEVER COULD SEE HER THE SOONEST.    SHE CAN BE REACHED [phone]

## 2021-01-04 NOTE — TELEPHONE ENCOUNTER
Pt did not want to come in and see dr. Rojas. The family asked if she could see someone else instead and I informed her that since dr. rojas had openings and he was her provider that she had to see him. Appointment made for today. The family is going to try their best to get her into the office today. Can you please put in the referral.

## 2021-01-05 PROBLEM — E53.8 B12 DEFICIENCY: Status: ACTIVE | Noted: 2021-01-01

## 2021-01-20 NOTE — PROGRESS NOTES
Subjective .     REASONS FOR FOLLOWUP:    1. Abnormal mammogram 6/09.    2. Remote history of breast cancer, status post lumpectomy and radiation therapy (right).    3. History of CLL, stage 0.    4. Thrombophilia.  Patient is a poor candidate for anticoagulation at this time due to risk of bleeding and falls.  5. Thrombocytopenia with platelet count around 70,000 - 80,000    History of Present Illness     Mrs Benavidez returns today for follow-up of early stage CLL and further management of anticoagulation with Coumadin.    In regards to her CLL, she denies any unintentional weight loss, drenching night sweats, or new adenopathies or nodules.  Her blood count is stable.    She has been on chronic Coumadin anticoagulation due to venous insufficiency and history of antiphospholipid syndrome.  I discussed this at length with the patient and her daughter today.  At this time I suspect the risk of Coumadin outweighs the benefit.  She has chronic swelling in the lower extremities and had a Doppler study done in December showing some chronic clot in the right popliteal vein but no acute clots.  At the end of our discussion we elected to discontinue Coumadin.    She had recently been undergoing evaluation for dementia with some hallucinations and paranoid ideation.  She was found also to have B12 deficiency and has been started on B12 replacement with daily oral B12.    She has a CT scan of the head scheduled for tomorrow and neurology appointment in the next couple of weeks.        Past Medical History:   Diagnosis Date   • Antiphospholipid syndrome (CMS/HCC)    • Atresia (acquired) of cervix     atresia   • Breast cancer (CMS/HCC) 2005    Right with lumpectomy   • Chronic kidney disease     Stage III   • Chronic lymphocytic leukemia (CMS/HCC)    • Cystocele    • Deep vein thrombosis of lower extremity (CMS/HCC)    • Glaucoma    • Gout of big toe 8/10/2016   • History of miscarriage    • History of radiation therapy    •  Hyperlipidemia    • Hypertension    • Lymphedema 1/3/2020   • Measles     as child   • Osteoarthritis    • Thrombocytopenia (CMS/[facility])    • Thrombophilia (CMS/HCC) 2/9/2017   • Venous stasis dermatitis of both lower extremities 2/7/2017       ONCOLOGIC HISTORY:  (History from previous dates can be found in the separate document.)    Current Outpatient Medications on File Prior to Visit   Medication Sig Dispense Refill   • betamethasone dipropionate 0.05 % cream Apply  topically to the appropriate area as directed Daily. To lower legs. 45 g 1   • diclofenac (VOLTAREN) 1 % gel gel Apply 4 grams to lower extremeties bid as needed 100 g 1   • dorzolamide (TRUSOPT) 2 % ophthalmic solution 1 drop 3 (three) times a day.     • furosemide (LASIX) 20 MG tablet Take 1 tablet by mouth Daily. 3 tablet 0   • ketoconazole (NIZORAL) 2 % cream Apply  topically to the appropriate area as directed Daily. Under breasts 60 g 1   • lisinopril (PRINIVIL,ZESTRIL) 40 MG tablet Take 40 mg by mouth Daily.     • timolol (TIMOPTIC) 0.25 % ophthalmic solution 1 drop 2 (two) times a day.     • vitamin B-12 (CYANOCOBALAMIN) 1000 MCG tablet Take 1 tablet by mouth Daily. 30 tablet 5   • [DISCONTINUED] warfarin (COUMADIN) 2 MG tablet Take 3 mg daily UNLESS OTHERWISE DIRECTED 90 tablet 3     No current facility-administered medications on file prior to visit.        ALLERGIES:     Allergies   Allergen Reactions   • Morphine And Related Hives   • Penicillins Itching       Social History     Socioeconomic History   • Marital status:      Spouse name: Not on file   • Number of children: 3   • Years of education: Not on file   • Highest education level: Not on file   Occupational History   • Occupation: ; HR     Employer: RETIRED   Tobacco Use   • Smoking status: Never Smoker   • Smokeless tobacco: Never Used   Substance and Sexual Activity   • Alcohol use: Yes     Frequency: Never     Comment: 1-3 beers q 6 months.    • Drug use: No  "  • Sexual activity: Not Currently   Lifestyle   • Physical activity     Days per week: 0 days     Minutes per session: 0 min   • Stress: Not on file   Social History Narrative    Diet - overall healthy; eats fruits and vegetables < daily; cooks at home    Exercise - None    Caffeine - 1-2 cups coffee         Cancer-related family history includes Lung cancer in her sister.     Review of Systems  A comprehensive 14 point review of systems was performed and was negative except as mentioned.    Objective      Vitals:    01/20/21 0827   BP: 152/77   Pulse: 63   Resp: 18   Temp: 97.1 °F (36.2 °C)   TempSrc: Skin   SpO2: 97%   Weight: Comment: unable   Height: 162.6 cm (64.02\")   PainSc: 0-No pain     Current Status 7/6/2020   ECOG score 2       Physical Exam   Constitutional: She appears well-developed.   HENT:   Head: Normocephalic.   Neck: Neck supple.   Cardiovascular: Normal rate.   Pulmonary/Chest: Effort normal and breath sounds normal.   Abdominal: Soft. She exhibits no mass.   Lymphadenopathy:     She has no cervical adenopathy.   Skin: Skin is warm and dry.   Psychiatric: Judgment and thought content normal.   Vitals reviewed.   Transported via wheelchair, with daughter  Areas of skin discoloration with age-related changes     RECENT LABS:  Lab Results   Component Value Date    WBC 8.90 01/20/2021    HGB 12.8 01/20/2021    HCT 41.3 01/20/2021    MCV 96.3 01/20/2021    PLT 98 (L) 01/20/2021     DOPPLER 12/15/2020  Interpretation Summary    · Chronic right lower extremity deep vein thrombosis noted in the popliteal.  · Left popliteal fossa fluid collection.  · All other veins appeared normal bilaterally.       Assessment/Plan       1.  CLL.  Her blood counts are stable no indication to initiate therapy at this time  2.  Thrombocytopenia.  Her platelet count slightly better today and remains in a safe range.  3.  Chronic Coumadin anticoagulation.  At this time, we feel the risk of Coumadin anticoagulation " outweighs the benefit and will plan to discontinue anticoagulation.  4.  B12 deficiency.  Patient is taking daily oral B12 prescribed by her primary care physician.  5.  Evaluation for dementia with CT of the head scheduled for tomorrow and neuropsychiatric evaluation in the next 2 weeks.    Plan  1.   MD follow-up in 6 months with CBC.  2.  Patient will discontinue Coumadin at this point.

## 2021-01-28 NOTE — TELEPHONE ENCOUNTER
Pt is still wanting the referral. The family called his office and he will do testing for memory.

## 2021-01-28 NOTE — TELEPHONE ENCOUNTER
Dr. Grace is an MD, neurologist.  He is NOT a neuropsychologist and does not do testing himself.  This is not a lateral referral, this is different pathway all together.

## 2021-01-28 NOTE — TELEPHONE ENCOUNTER
Pt's daughter called and stated that Dr. Maya office is not going to do testing at her first appointment. The family is wanting to switch to Dr. Keaton Grace with [facility] [phone]. fax [phone]. Attcarlos turpin for referral.